# Patient Record
Sex: MALE | Race: BLACK OR AFRICAN AMERICAN | Employment: UNEMPLOYED | URBAN - METROPOLITAN AREA
[De-identification: names, ages, dates, MRNs, and addresses within clinical notes are randomized per-mention and may not be internally consistent; named-entity substitution may affect disease eponyms.]

---

## 2018-11-20 ENCOUNTER — HOSPITAL ENCOUNTER (EMERGENCY)
Age: 43
Discharge: HOME | End: 2018-11-20
Attending: EMERGENCY MEDICINE
Payer: MEDICAID

## 2018-11-20 ENCOUNTER — APPOINTMENT (OUTPATIENT)
Dept: CT IMAGING | Age: 43
End: 2018-11-20
Payer: MEDICAID

## 2018-11-20 VITALS
DIASTOLIC BLOOD PRESSURE: 81 MMHG | RESPIRATION RATE: 18 BRPM | SYSTOLIC BLOOD PRESSURE: 130 MMHG | BODY MASS INDEX: 33.97 KG/M2 | OXYGEN SATURATION: 99 % | HEART RATE: 70 BPM | WEIGHT: 236.77 LBS | TEMPERATURE: 97.4 F

## 2018-11-20 DIAGNOSIS — K76.9 LIVER LESION: ICD-10-CM

## 2018-11-20 DIAGNOSIS — S20.219A CONTUSION OF CHEST WALL, UNSPECIFIED LATERALITY, INITIAL ENCOUNTER: Primary | ICD-10-CM

## 2018-11-20 PROCEDURE — 93005 ELECTROCARDIOGRAM TRACING: CPT | Performed by: EMERGENCY MEDICINE

## 2018-11-20 PROCEDURE — 99284 EMERGENCY DEPT VISIT MOD MDM: CPT

## 2018-11-20 PROCEDURE — 6370000000 HC RX 637 (ALT 250 FOR IP): Performed by: EMERGENCY MEDICINE

## 2018-11-20 PROCEDURE — 71250 CT THORAX DX C-: CPT

## 2018-11-20 RX ORDER — OXYCODONE HYDROCHLORIDE AND ACETAMINOPHEN 5; 325 MG/1; MG/1
1 TABLET ORAL ONCE
Status: COMPLETED | OUTPATIENT
Start: 2018-11-20 | End: 2018-11-20

## 2018-11-20 RX ORDER — IBUPROFEN 400 MG/1
400 TABLET ORAL EVERY 6 HOURS PRN
Qty: 30 TABLET | Refills: 0 | Status: SHIPPED | OUTPATIENT
Start: 2018-11-20 | End: 2019-07-09

## 2018-11-20 RX ORDER — CYCLOBENZAPRINE HCL 10 MG
10 TABLET ORAL 3 TIMES DAILY PRN
Qty: 30 TABLET | Refills: 0 | Status: SHIPPED | OUTPATIENT
Start: 2018-11-20 | End: 2018-11-30

## 2018-11-20 RX ADMIN — LIDOCAINE HYDROCHLORIDE: 20 SOLUTION ORAL; TOPICAL at 02:36

## 2018-11-20 RX ADMIN — OXYCODONE AND ACETAMINOPHEN 1 TABLET: 5; 325 TABLET ORAL at 02:36

## 2018-11-20 ASSESSMENT — PAIN SCALES - GENERAL
PAINLEVEL_OUTOF10: 8
PAINLEVEL_OUTOF10: 9

## 2018-11-20 ASSESSMENT — PAIN DESCRIPTION - PAIN TYPE
TYPE: ACUTE PAIN

## 2018-11-20 ASSESSMENT — PAIN DESCRIPTION - LOCATION
LOCATION: CHEST

## 2018-11-20 ASSESSMENT — PAIN - FUNCTIONAL ASSESSMENT: PAIN_FUNCTIONAL_ASSESSMENT: 0-10

## 2018-11-20 NOTE — ED PROVIDER NOTES
UNIT/ML injection Inject 25 Units into the skin nightly. 7/22/13   NARESH Mcconnell - CNP   Blood Glucose Monitoring Suppl (FREESTYLE LITE) MARGARET 1 kit by Does not apply route 3 times daily. 6/6/13   Chao Flores MD   glucose blood VI test strips (FREESTYLE LITE) strip Patient to check blood sugar 4 times a day, he is treated with multiple daily injections of insulin  . 6/6/13   Chao Flores MD   FreeStyle Lancets MISC 100 each by Does not apply route 3 times daily. 6/6/13   Chao Flores MD   ibuprofen (ADVIL;MOTRIN) 600 MG tablet Take 1 tablet by mouth 3 times daily (with meals) for 7 days. 3/26/13 4/2/13  ANAT Seth       Allergies as of 11/20/2018 - Review Complete 11/20/2018   Allergen Reaction Noted    Dilaudid [hydromorphone] Itching 11/22/2013       Past Medical History:   Diagnosis Date    Diabetes mellitus ([facility] [address]     Hyperlipidemia     Hypertension     Neuropathy     Psychiatric problem     depression        Surgical History:   Past Surgical History:   Procedure Laterality Date    HIP SURGERY  01/2013    OTHER SURGICAL HISTORY Right 1/14/2013    excision of mass right hip        Family History:    Family History   Problem Relation Age of Onset    Diabetes Maternal Grandmother        Social History     Social History    Marital status: Legally      Spouse name: N/A    Number of children: N/A    Years of education: N/A     Occupational History    Not on file. Social History Main Topics    Smoking status: Former Smoker    Smokeless tobacco: Former User     Quit date: 1/8/2007    Alcohol use No      Comment: occasional    Drug use: Yes     Types: Marijuana      Comment: \"Smoke a little weed now and then for appetitie\".  Sexual activity: Not on file     Other Topics Concern    Not on file     Social History Narrative    No narrative on file       Nursing notes reviewed.     ED Triage Vitals [11/20/18 0103]   Enc Vitals Group      BP

## 2018-11-21 PROCEDURE — 93010 ELECTROCARDIOGRAM REPORT: CPT | Performed by: INTERNAL MEDICINE

## 2018-12-12 LAB
EKG ATRIAL RATE: 61 BPM
EKG DIAGNOSIS: NORMAL
EKG P AXIS: 71 DEGREES
EKG P-R INTERVAL: 166 MS
EKG Q-T INTERVAL: 416 MS
EKG QRS DURATION: 86 MS
EKG QTC CALCULATION (BAZETT): 418 MS
EKG R AXIS: 96 DEGREES
EKG T AXIS: -11 DEGREES
EKG VENTRICULAR RATE: 61 BPM

## 2019-07-09 ENCOUNTER — OFFICE VISIT (OUTPATIENT)
Dept: FAMILY MEDICINE CLINIC | Age: 44
End: 2019-07-09
Payer: MEDICAID

## 2019-07-09 VITALS
SYSTOLIC BLOOD PRESSURE: 138 MMHG | HEIGHT: 70 IN | OXYGEN SATURATION: 96 % | WEIGHT: 236.25 LBS | BODY MASS INDEX: 33.82 KG/M2 | DIASTOLIC BLOOD PRESSURE: 82 MMHG | HEART RATE: 72 BPM

## 2019-07-09 DIAGNOSIS — I10 ESSENTIAL HYPERTENSION: ICD-10-CM

## 2019-07-09 DIAGNOSIS — E11.40 TYPE 2 DIABETES MELLITUS WITH DIABETIC NEUROPATHY, WITH LONG-TERM CURRENT USE OF INSULIN (HCC): Primary | ICD-10-CM

## 2019-07-09 DIAGNOSIS — E78.5 HYPERLIPIDEMIA, UNSPECIFIED HYPERLIPIDEMIA TYPE: ICD-10-CM

## 2019-07-09 DIAGNOSIS — Z23 NEED FOR PNEUMOCOCCAL VACCINATION: ICD-10-CM

## 2019-07-09 DIAGNOSIS — Z79.4 TYPE 2 DIABETES MELLITUS WITH DIABETIC NEUROPATHY, WITH LONG-TERM CURRENT USE OF INSULIN (HCC): Primary | ICD-10-CM

## 2019-07-09 LAB
A/G RATIO: 1.5 (ref 1.1–2.2)
ALBUMIN SERPL-MCNC: 4.5 G/DL (ref 3.4–5)
ALP BLD-CCNC: 96 U/L (ref 40–129)
ALT SERPL-CCNC: 19 U/L (ref 10–40)
ANION GAP SERPL CALCULATED.3IONS-SCNC: 16 MMOL/L (ref 3–16)
AST SERPL-CCNC: 22 U/L (ref 15–37)
BILIRUB SERPL-MCNC: 0.8 MG/DL (ref 0–1)
BUN BLDV-MCNC: 9 MG/DL (ref 7–20)
CALCIUM SERPL-MCNC: 10 MG/DL (ref 8.3–10.6)
CHLORIDE BLD-SCNC: 103 MMOL/L (ref 99–110)
CHOLESTEROL, TOTAL: 178 MG/DL (ref 0–199)
CO2: 26 MMOL/L (ref 21–32)
CREAT SERPL-MCNC: 1 MG/DL (ref 0.9–1.3)
GFR AFRICAN AMERICAN: >60
GFR NON-AFRICAN AMERICAN: >60
GLOBULIN: 3 G/DL
GLUCOSE BLD-MCNC: 101 MG/DL (ref 70–99)
HBA1C MFR BLD: 6.6 %
HDLC SERPL-MCNC: 33 MG/DL (ref 40–60)
LDL CHOLESTEROL CALCULATED: 129 MG/DL
POTASSIUM SERPL-SCNC: 4.4 MMOL/L (ref 3.5–5.1)
SODIUM BLD-SCNC: 145 MMOL/L (ref 136–145)
TOTAL PROTEIN: 7.5 G/DL (ref 6.4–8.2)
TRIGL SERPL-MCNC: 82 MG/DL (ref 0–150)
VLDLC SERPL CALC-MCNC: 16 MG/DL

## 2019-07-09 PROCEDURE — 90471 IMMUNIZATION ADMIN: CPT | Performed by: INTERNAL MEDICINE

## 2019-07-09 PROCEDURE — 36415 COLL VENOUS BLD VENIPUNCTURE: CPT | Performed by: INTERNAL MEDICINE

## 2019-07-09 PROCEDURE — 3044F HG A1C LEVEL LT 7.0%: CPT | Performed by: INTERNAL MEDICINE

## 2019-07-09 PROCEDURE — 1036F TOBACCO NON-USER: CPT | Performed by: INTERNAL MEDICINE

## 2019-07-09 PROCEDURE — 99204 OFFICE O/P NEW MOD 45 MIN: CPT | Performed by: INTERNAL MEDICINE

## 2019-07-09 PROCEDURE — 90732 PPSV23 VACC 2 YRS+ SUBQ/IM: CPT | Performed by: INTERNAL MEDICINE

## 2019-07-09 PROCEDURE — 2022F DILAT RTA XM EVC RTNOPTHY: CPT | Performed by: INTERNAL MEDICINE

## 2019-07-09 PROCEDURE — 83036 HEMOGLOBIN GLYCOSYLATED A1C: CPT | Performed by: INTERNAL MEDICINE

## 2019-07-09 PROCEDURE — G8427 DOCREV CUR MEDS BY ELIG CLIN: HCPCS | Performed by: INTERNAL MEDICINE

## 2019-07-09 PROCEDURE — G8417 CALC BMI ABV UP PARAM F/U: HCPCS | Performed by: INTERNAL MEDICINE

## 2019-07-09 RX ORDER — OMEPRAZOLE 40 MG/1
CAPSULE, DELAYED RELEASE ORAL
COMMUNITY
Start: 2018-12-12 | End: 2019-07-09 | Stop reason: SDUPTHER

## 2019-07-09 RX ORDER — BACLOFEN 10 MG/1
10 TABLET ORAL 2 TIMES DAILY
Qty: 60 TABLET | Refills: 5 | Status: SHIPPED | OUTPATIENT
Start: 2019-07-09 | End: 2020-03-09

## 2019-07-09 RX ORDER — OMEPRAZOLE 40 MG/1
40 CAPSULE, DELAYED RELEASE ORAL DAILY
Qty: 30 CAPSULE | Refills: 5 | Status: SHIPPED | OUTPATIENT
Start: 2019-07-09 | End: 2020-03-09

## 2019-07-09 RX ORDER — ATORVASTATIN CALCIUM 20 MG/1
20 TABLET, FILM COATED ORAL DAILY
Qty: 30 TABLET | Refills: 5 | Status: SHIPPED | OUTPATIENT
Start: 2019-07-09 | End: 2019-07-11 | Stop reason: SDUPTHER

## 2019-07-09 RX ORDER — BLOOD-GLUCOSE METER
1 EACH MISCELLANEOUS DAILY
Qty: 1 KIT | Refills: 0 | Status: SHIPPED | OUTPATIENT
Start: 2019-07-09 | End: 2020-04-23 | Stop reason: SDUPTHER

## 2019-07-09 RX ORDER — GLUCOSAMINE HCL/CHONDROITIN SU 500-400 MG
CAPSULE ORAL
Refills: 2 | COMMUNITY
Start: 2019-05-14 | End: 2019-07-09 | Stop reason: SDUPTHER

## 2019-07-09 RX ORDER — BACLOFEN 10 MG/1
TABLET ORAL
COMMUNITY
Start: 2018-12-12 | End: 2019-07-09

## 2019-07-09 RX ORDER — INSULIN GLARGINE 100 [IU]/ML
INJECTION, SOLUTION SUBCUTANEOUS
Refills: 0 | COMMUNITY
Start: 2019-05-07 | End: 2019-07-09

## 2019-07-09 RX ORDER — LISINOPRIL 5 MG/1
5 TABLET ORAL DAILY
Qty: 30 TABLET | Refills: 5 | Status: SHIPPED | OUTPATIENT
Start: 2019-07-09 | End: 2020-02-03

## 2019-07-09 RX ORDER — LISINOPRIL 20 MG/1
TABLET ORAL
Refills: 2 | COMMUNITY
Start: 2019-06-14 | End: 2019-07-09

## 2019-07-09 RX ORDER — ATORVASTATIN CALCIUM 20 MG/1
TABLET, FILM COATED ORAL
Refills: 1 | COMMUNITY
Start: 2019-06-14 | End: 2019-07-09 | Stop reason: SDUPTHER

## 2019-07-09 RX ORDER — PEN NEEDLE, DIABETIC 32GX 5/32"
NEEDLE, DISPOSABLE MISCELLANEOUS
Refills: 3 | COMMUNITY
Start: 2019-05-14 | End: 2019-07-09

## 2019-07-09 RX ORDER — GLUCOSAMINE HCL/CHONDROITIN SU 500-400 MG
1 CAPSULE ORAL 4 TIMES DAILY
Qty: 120 EACH | Refills: 5 | Status: SHIPPED | OUTPATIENT
Start: 2019-07-09 | End: 2020-10-13 | Stop reason: SDUPTHER

## 2019-07-09 ASSESSMENT — PATIENT HEALTH QUESTIONNAIRE - PHQ9
SUM OF ALL RESPONSES TO PHQ QUESTIONS 1-9: 0
SUM OF ALL RESPONSES TO PHQ QUESTIONS 1-9: 0
1. LITTLE INTEREST OR PLEASURE IN DOING THINGS: 0
2. FEELING DOWN, DEPRESSED OR HOPELESS: 0
SUM OF ALL RESPONSES TO PHQ9 QUESTIONS 1 & 2: 0

## 2019-07-09 NOTE — PROGRESS NOTES
round, and reactive to light. Conjunctivae and EOM are normal.   Right Ear: External ear normal. TM normal  Left Ear: External ear normal. TM normal  Nose: Nose normal.   Mouth/Throat: Oropharynx is clear and moist.   Cardiovascular: Normal rate, regular rhythm and normal heart sounds. No murmur heard. Pulmonary/Chest: Effort normal. No respiratory distress. No wheezes, rhonchi, or crackles  Abdominal: Soft. Bowel sounds are normal. No distension. There is no tenderness. Musculoskeletal: Normal range of motion. No edema, tenderness or deformity. Lymphadenopathy: No cervical adenopathy. Neurological: alert. No cranial nerve deficit. Skin: Skin is warm and dry. Capillary refill takes less than 2 seconds. No rash noted. Psychiatric: Normal mood and affect. Assessment and Plan: Raissa Sanabria is a 44 y.o. male presenting today to establish care. Manolo Ashraf was seen today for established new doctor, diabetes, hypertension and hyperlipidemia. Diagnoses and all orders for this visit:    Type 2 diabetes mellitus with diabetic neuropathy, with long-term current use of insulin ([address]  Very good glycemic control. Continue present management. Work on lifestyle habits. He is reminded to have his eye exam.  Regular foot care. -     Comprehensive Metabolic Panel  -     Lipid Panel  -     POCT glycosylated hemoglobin (Hb A1C)    Essential hypertension  Blood pressure above goal off of therapy. He has difficulty with compliance on higher doses of the lisinopril. We will try him at a lower dose and have him take it daily  Hyperlipidemia, unspecified hyperlipidemia type  Continue statin     need for pneumococcal vaccination  Patient agreeable, administered today. Other orders  -     blood glucose test strips (ASCENSIA AUTODISC VI;ONE TOUCH ULTRA TEST VI) strip; 1 each by In Vitro route 4 times daily As needed. -     insulin glargine (BASAGLAR KWIKPEN) 100 UNIT/ML injection pen;  Inject 50 Units into the skin

## 2019-07-11 RX ORDER — ATORVASTATIN CALCIUM 40 MG/1
40 TABLET, FILM COATED ORAL DAILY
Qty: 30 TABLET | Refills: 5 | Status: SHIPPED | OUTPATIENT
Start: 2019-07-11 | End: 2020-03-09

## 2019-07-12 ENCOUNTER — TELEPHONE (OUTPATIENT)
Dept: ORTHOPEDIC SURGERY | Age: 44
End: 2019-07-12

## 2019-07-18 ENCOUNTER — TELEPHONE (OUTPATIENT)
Dept: FAMILY MEDICINE CLINIC | Age: 44
End: 2019-07-18

## 2019-10-14 ENCOUNTER — OFFICE VISIT (OUTPATIENT)
Dept: FAMILY MEDICINE CLINIC | Age: 44
End: 2019-10-14
Payer: MEDICAID

## 2019-10-14 VITALS
OXYGEN SATURATION: 97 % | HEART RATE: 71 BPM | SYSTOLIC BLOOD PRESSURE: 122 MMHG | WEIGHT: 237.4 LBS | RESPIRATION RATE: 17 BRPM | BODY MASS INDEX: 34.06 KG/M2 | DIASTOLIC BLOOD PRESSURE: 72 MMHG

## 2019-10-14 DIAGNOSIS — E11.40 TYPE 2 DIABETES MELLITUS WITH DIABETIC NEUROPATHY, WITH LONG-TERM CURRENT USE OF INSULIN (HCC): Primary | ICD-10-CM

## 2019-10-14 DIAGNOSIS — Z79.4 TYPE 2 DIABETES MELLITUS WITH DIABETIC NEUROPATHY, WITH LONG-TERM CURRENT USE OF INSULIN (HCC): Primary | ICD-10-CM

## 2019-10-14 DIAGNOSIS — E78.5 HYPERLIPIDEMIA, UNSPECIFIED HYPERLIPIDEMIA TYPE: ICD-10-CM

## 2019-10-14 DIAGNOSIS — I10 ESSENTIAL HYPERTENSION: ICD-10-CM

## 2019-10-14 LAB — HBA1C MFR BLD: 6.3 %

## 2019-10-14 PROCEDURE — 3044F HG A1C LEVEL LT 7.0%: CPT | Performed by: INTERNAL MEDICINE

## 2019-10-14 PROCEDURE — 83036 HEMOGLOBIN GLYCOSYLATED A1C: CPT | Performed by: INTERNAL MEDICINE

## 2019-10-14 PROCEDURE — 99214 OFFICE O/P EST MOD 30 MIN: CPT | Performed by: INTERNAL MEDICINE

## 2019-10-14 PROCEDURE — 2022F DILAT RTA XM EVC RTNOPTHY: CPT | Performed by: INTERNAL MEDICINE

## 2019-10-14 PROCEDURE — G8417 CALC BMI ABV UP PARAM F/U: HCPCS | Performed by: INTERNAL MEDICINE

## 2019-10-14 PROCEDURE — G8484 FLU IMMUNIZE NO ADMIN: HCPCS | Performed by: INTERNAL MEDICINE

## 2019-10-14 PROCEDURE — G8427 DOCREV CUR MEDS BY ELIG CLIN: HCPCS | Performed by: INTERNAL MEDICINE

## 2019-10-14 PROCEDURE — 1036F TOBACCO NON-USER: CPT | Performed by: INTERNAL MEDICINE

## 2019-10-14 ASSESSMENT — ENCOUNTER SYMPTOMS: SHORTNESS OF BREATH: 0

## 2020-01-07 ENCOUNTER — TELEPHONE (OUTPATIENT)
Dept: INTERNAL MEDICINE CLINIC | Age: 45
End: 2020-01-07

## 2020-01-07 NOTE — TELEPHONE ENCOUNTER
Submitted PA for HumaLOG KwikPen 100UNIT/ML pen-injectors    Via CMM Key: [id number]    STATUS: PENDING

## 2020-01-09 ENCOUNTER — TELEPHONE (OUTPATIENT)
Dept: FAMILY MEDICINE CLINIC | Age: 45
End: 2020-01-09

## 2020-01-09 RX ORDER — INDOMETHACIN 50 MG/1
50 CAPSULE ORAL 3 TIMES DAILY
COMMUNITY
Start: 2018-12-12 | End: 2021-04-06

## 2020-01-09 NOTE — TELEPHONE ENCOUNTER
Spoke to pt, he said he would like a referral for a hand specialist.  The pt stated when he was at [facility] they did recommend a hand specialist, but they apparently didn't write a referral.

## 2020-01-09 NOTE — TELEPHONE ENCOUNTER
PT says that about two weeks ago he went to the ER because his ring finger on his right hand was swelled up. PT says he had an MRI and Xray done on that hand neither showed anything wrong. PT says that the swelling has made his whole hand hurt and he's unable to make a fist with that hand due to the swelling and pain. PT would like to know what to do.     Please call PT back: [phone]

## 2020-01-22 NOTE — TELEPHONE ENCOUNTER
Pt states since he started working, he tests his BS 5-6 times a day .  He is due for his strips 1-23, the pharmacy will not refill til then , he has been out 3 days, Albert([phone]) states we have to start the PA or over ride to get this filled today

## 2020-01-22 NOTE — TELEPHONE ENCOUNTER
Called Pharmacy-they stated nothing we can do as far as PA-refill too soon  Pt will have to wait til 1-23 to have filled  Pt l/m detailed

## 2020-01-24 RX ORDER — INSULIN GLARGINE 100 [IU]/ML
INJECTION, SOLUTION SUBCUTANEOUS
Qty: 15 ML | Refills: 5 | Status: SHIPPED | OUTPATIENT
Start: 2020-01-24 | End: 2020-02-06

## 2020-01-24 RX ORDER — INSULIN LISPRO 100 [IU]/ML
INJECTION, SOLUTION INTRAVENOUS; SUBCUTANEOUS
Qty: 15 ML | Refills: 5 | Status: SHIPPED | OUTPATIENT
Start: 2020-01-24 | End: 2020-03-03

## 2020-02-03 RX ORDER — LISINOPRIL 5 MG/1
5 TABLET ORAL DAILY
Qty: 30 TABLET | Refills: 5 | Status: SHIPPED | OUTPATIENT
Start: 2020-02-03 | End: 2020-06-09

## 2020-02-06 RX ORDER — INSULIN GLARGINE 100 [IU]/ML
INJECTION, SOLUTION SUBCUTANEOUS
Qty: 15 ML | Refills: 5 | Status: SHIPPED | OUTPATIENT
Start: 2020-02-06 | End: 2020-03-03

## 2020-03-03 RX ORDER — INSULIN LISPRO 100 [IU]/ML
INJECTION, SOLUTION INTRAVENOUS; SUBCUTANEOUS
Qty: 15 ML | Refills: 5 | Status: SHIPPED | OUTPATIENT
Start: 2020-03-03 | End: 2020-04-15

## 2020-03-03 RX ORDER — INSULIN GLARGINE 100 [IU]/ML
INJECTION, SOLUTION SUBCUTANEOUS
Qty: 15 ML | Refills: 5 | Status: SHIPPED | OUTPATIENT
Start: 2020-03-03 | End: 2020-08-31

## 2020-03-09 RX ORDER — ATORVASTATIN CALCIUM 40 MG/1
40 TABLET, FILM COATED ORAL DAILY
Qty: 30 TABLET | Refills: 5 | Status: SHIPPED | OUTPATIENT
Start: 2020-03-09 | End: 2020-09-10

## 2020-03-09 RX ORDER — PEN NEEDLE, DIABETIC 32GX 5/32"
NEEDLE, DISPOSABLE MISCELLANEOUS
Qty: 100 EACH | Refills: 5 | Status: SHIPPED | OUTPATIENT
Start: 2020-03-09 | End: 2020-11-12 | Stop reason: SDUPTHER

## 2020-03-09 RX ORDER — BACLOFEN 10 MG/1
TABLET ORAL
Qty: 60 TABLET | Refills: 5 | Status: SHIPPED | OUTPATIENT
Start: 2020-03-09 | End: 2022-03-17 | Stop reason: SDUPTHER

## 2020-03-09 RX ORDER — OMEPRAZOLE 40 MG/1
40 CAPSULE, DELAYED RELEASE ORAL DAILY
Qty: 30 CAPSULE | Refills: 5 | Status: SHIPPED | OUTPATIENT
Start: 2020-03-09 | End: 2020-10-13

## 2020-04-10 ENCOUNTER — TELEPHONE (OUTPATIENT)
Dept: ADMINISTRATIVE | Age: 45
End: 2020-04-10

## 2020-04-10 NOTE — TELEPHONE ENCOUNTER
Submitted PA for HumaLOG KwikPen 100UNIT/ML pen-injectors    Via CMM  (Key: [id number]   STATUS: PENDING

## 2020-04-15 RX ORDER — INSULIN LISPRO 100 [IU]/ML
30 INJECTION, SOLUTION INTRAVENOUS; SUBCUTANEOUS
Qty: 15 ML | Refills: 5 | Status: SHIPPED | OUTPATIENT
Start: 2020-04-15 | End: 2020-10-13

## 2020-04-20 NOTE — TELEPHONE ENCOUNTER
pls let him know that a PA was done for  humalog and denied - lispro is the same medication essentially.  Monitor blood sugar and if noticing an increase, we will need to increase his dose

## 2020-04-23 RX ORDER — BLOOD-GLUCOSE METER
1 EACH MISCELLANEOUS DAILY
Qty: 1 KIT | Refills: 0 | Status: SHIPPED | OUTPATIENT
Start: 2020-04-23 | End: 2021-04-08 | Stop reason: SDUPTHER

## 2020-06-09 RX ORDER — LISINOPRIL 5 MG/1
5 TABLET ORAL DAILY
Qty: 30 TABLET | Refills: 5 | Status: SHIPPED | OUTPATIENT
Start: 2020-06-09 | End: 2021-01-20

## 2020-07-08 ENCOUNTER — HOSPITAL ENCOUNTER (EMERGENCY)
Age: 45
Discharge: HOME | End: 2020-07-08
Payer: MEDICAID

## 2020-07-08 ENCOUNTER — APPOINTMENT (OUTPATIENT)
Dept: GENERAL RADIOLOGY | Age: 45
End: 2020-07-08
Payer: MEDICAID

## 2020-07-08 VITALS
WEIGHT: 227.31 LBS | DIASTOLIC BLOOD PRESSURE: 99 MMHG | HEART RATE: 64 BPM | BODY MASS INDEX: 31.82 KG/M2 | HEIGHT: 71 IN | SYSTOLIC BLOOD PRESSURE: 176 MMHG | OXYGEN SATURATION: 100 % | TEMPERATURE: 98.3 F | RESPIRATION RATE: 16 BRPM

## 2020-07-08 PROCEDURE — 99283 EMERGENCY DEPT VISIT LOW MDM: CPT

## 2020-07-08 PROCEDURE — 72110 X-RAY EXAM L-2 SPINE 4/>VWS: CPT

## 2020-07-08 PROCEDURE — 6370000000 HC RX 637 (ALT 250 FOR IP): Performed by: PHYSICIAN ASSISTANT

## 2020-07-08 PROCEDURE — 72100 X-RAY EXAM L-S SPINE 2/3 VWS: CPT

## 2020-07-08 RX ORDER — METHOCARBAMOL 500 MG/1
1000 TABLET, FILM COATED ORAL ONCE
Status: COMPLETED | OUTPATIENT
Start: 2020-07-08 | End: 2020-07-08

## 2020-07-08 RX ORDER — METHOCARBAMOL 750 MG/1
750 TABLET, FILM COATED ORAL 2 TIMES DAILY
Qty: 20 TABLET | Refills: 0 | Status: SHIPPED | OUTPATIENT
Start: 2020-07-08 | End: 2021-04-06

## 2020-07-08 RX ORDER — IBUPROFEN 800 MG/1
800 TABLET ORAL EVERY 8 HOURS PRN
Qty: 30 TABLET | Refills: 0 | Status: SHIPPED | OUTPATIENT
Start: 2020-07-08 | End: 2020-10-09

## 2020-07-08 RX ORDER — OXYCODONE HYDROCHLORIDE AND ACETAMINOPHEN 5; 325 MG/1; MG/1
1 TABLET ORAL ONCE
Status: COMPLETED | OUTPATIENT
Start: 2020-07-08 | End: 2020-07-08

## 2020-07-08 RX ORDER — HYDROCODONE BITARTRATE AND ACETAMINOPHEN 5; 325 MG/1; MG/1
1 TABLET ORAL EVERY 6 HOURS PRN
Qty: 12 TABLET | Refills: 0 | Status: SHIPPED | OUTPATIENT
Start: 2020-07-08 | End: 2020-07-11

## 2020-07-08 RX ORDER — IBUPROFEN 400 MG/1
800 TABLET ORAL ONCE
Status: COMPLETED | OUTPATIENT
Start: 2020-07-08 | End: 2020-07-08

## 2020-07-08 RX ADMIN — METHOCARBAMOL TABLETS 1000 MG: 500 TABLET, COATED ORAL at 13:39

## 2020-07-08 RX ADMIN — IBUPROFEN 800 MG: 400 TABLET, FILM COATED ORAL at 13:39

## 2020-07-08 RX ADMIN — OXYCODONE HYDROCHLORIDE AND ACETAMINOPHEN 1 TABLET: 5; 325 TABLET ORAL at 13:39

## 2020-07-08 ASSESSMENT — ENCOUNTER SYMPTOMS
CHOKING: 0
APNEA: 0
VOMITING: 0
ABDOMINAL PAIN: 0
BACK PAIN: 1
EYE REDNESS: 0
SHORTNESS OF BREATH: 0
EYE DISCHARGE: 0
NAUSEA: 0
FACIAL SWELLING: 0

## 2020-07-08 ASSESSMENT — PAIN DESCRIPTION - PAIN TYPE: TYPE: ACUTE PAIN

## 2020-07-08 ASSESSMENT — PAIN SCALES - GENERAL
PAINLEVEL_OUTOF10: 10
PAINLEVEL_OUTOF10: 10

## 2020-07-08 ASSESSMENT — PAIN DESCRIPTION - LOCATION: LOCATION: BACK

## 2020-07-08 ASSESSMENT — PAIN DESCRIPTION - ORIENTATION: ORIENTATION: LOWER;MID

## 2020-07-08 NOTE — ED PROVIDER NOTES
**EVALUATED BY ADVANCED PRACTICE PROVIDER**        [address] [facility]      Pt Name: Fatou Hall  ZORAIDA:[id number]  [place] 1975  Date of evaluation: 7/8/2020  Provider: Josse Koch PA-C      Chief Complaint:    Chief Complaint   Patient presents with    Back Pain     lower back pain started yesterday around 2030 after coming home from work        Nursing Notes, Past Medical Hx, Past Surgical Hx, Social Hx, Allergies, and Family Hx were all reviewed and agreed with or any disagreements were addressed in the HPI.    HPI:  (Location, Duration, Timing, Severity, Quality, Assoc Sx, Context, Modifying factors)  This is a  38 y.o. male complain of low back pain. 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 He gets some pain every day. But yesterday started having severe pain when he got home. Said he woke up this morning could barely stand up. He decided to lay down and when he try to get up again it was so severe that he could barely move. He came in by squad. Denies abdominal pain, no loss of bowel or urinary control. No numbness or tingling in his feet or finger. No upper back or neck pain. There is no direct injury. No other complaints.         PastMedical/Surgical History:      Diagnosis Date    Diabetes mellitus ([address]     Hyperlipidemia     Hypertension     Neuropathy     Psychiatric problem     depression         Procedure Laterality Date    HIP SURGERY  01/2013    OTHER SURGICAL HISTORY Right 1/14/2013    excision of mass right hip       Medications:  Previous Medications    ALCOHOL SWABS 70 % PADS    Apply 1 Units topically 4 times daily    ATORVASTATIN (LIPITOR) 40 MG TABLET    TAKE 1 TABLET BY MOUTH DAILY    BACLOFEN (LIORESAL) 10 MG TABLET    TAKE 1 TABLET BY MOUTH TWICE DAILY    BASAGLAR KWIKPEN 100 UNIT/ML INJECTION PEN    INJECT 50 UNITS INTO THE SKIN DAILY    BD PEN NEEDLE VIBHA U/F 32G X 4 MM MISC    USE AS DIRECTED FOUR TIMES DAILY    BLOOD GLUCOSE MONITORING SUPPL (ONE TOUCH ULTRA 2) W/DEVICE KIT    1 kit by Does not apply route daily    DICLOFENAC SODIUM 1 % GEL    Apply  topically 4 times daily as needed for Pain. INDOMETHACIN (INDOCIN) 50 MG CAPSULE    Take 50 mg by mouth 3 times daily    INSULIN LISPRO, 1 UNIT DIAL, (HUMALOG KWIKPEN) 100 UNIT/ML SOPN    Inject 30 Units into the skin 3 times daily (before meals) Generic lispro    LISINOPRIL (PRINIVIL;ZESTRIL) 5 MG TABLET    TAKE 1 TABLET BY MOUTH DAILY    OMEPRAZOLE (PRILOSEC) 40 MG DELAYED RELEASE CAPSULE    TAKE 1 CAPSULE BY MOUTH DAILY    ONE TOUCH ULTRA TEST STRIP    TEST FOUR TIMES DAILY AS NEEDED         Review of Systems:  Review of Systems   Constitutional: Negative for chills and fever. HENT: Negative for congestion, facial swelling and sneezing. Eyes: Negative for discharge and redness. Respiratory: Negative for apnea, choking and shortness of breath. Cardiovascular: Negative for chest pain. Gastrointestinal: Negative for abdominal pain, nausea and vomiting. Genitourinary: Negative for dysuria. Musculoskeletal: Positive for back pain. Negative for neck pain and neck stiffness. Neurological: Negative for dizziness, tremors, seizures and headaches. All other systems reviewed and are negative. Positives and Pertinent negatives as per HPI. Except as noted above in the ROS, problem specific ROS was completed and is negative. Physical Exam:  Physical Exam  Vitals signs and nursing note reviewed. Constitutional:       Appearance: He is well-developed. He is not diaphoretic. HENT:      Head: Normocephalic and atraumatic. Nose: Nose normal.      Mouth/Throat:      Mouth: Mucous membranes are moist.      Pharynx: Oropharynx is clear. Eyes:      General:         Right eye: No discharge. Left eye: No discharge. Extraocular Movements: Extraocular movements intact.       Conjunctiva/sclera: Conjunctivae normal. Pupils: Pupils are equal, round, and reactive to light. Neck:      Musculoskeletal: Normal range of motion and neck supple. Cardiovascular:      Rate and Rhythm: Normal rate and regular rhythm. Heart sounds: Normal heart sounds. No murmur. No friction rub. No gallop. Pulmonary:      Effort: Pulmonary effort is normal. No respiratory distress. Breath sounds: Normal breath sounds. No wheezing or rales. Chest:      Chest wall: No tenderness. Abdominal:      General: Abdomen is flat. Bowel sounds are normal.      Palpations: Abdomen is soft. Tenderness: There is no abdominal tenderness. Musculoskeletal: Normal range of motion. Skin:     General: Skin is warm and dry. Neurological:      Mental Status: He is alert and oriented to person, place, and time. Psychiatric:         Behavior: Behavior normal.         MEDICAL DECISION MAKING    Vitals:    Vitals:    07/08/20 1257   BP: (!) 176/99   Pulse: 64   Resp: 16   Temp: 98.3 °F (36.8 °C)   TempSrc: Oral   SpO2: 100%   Weight: 227 lb 5 oz (103.1 kg)   Height: 5' 10.5\" (1.791 m)       LABS:Labs Reviewed - No data to display     Remainder of labs reviewed and werenegative at this time or not returned at the time of this note. RADIOLOGY:   Non-plain film images such as CT, Ultrasound and MRI are read by the radiologist. Millie Craig PA-C have directly visualized the radiologic plain film image(s) with the below findings:        Interpretation per the Radiologist below, if available at the time of this note:    XR LUMBAR SPINE (MIN 4 VIEWS)   Final Result   No acute abnormality. No fracture or malalignment. Mild degenerative   changes of the facet joints. No results found.        MEDICAL DECISION MAKING / ED COURSE:      PROCEDURES:   Procedures    None    Patient was given:  Medications   ibuprofen (ADVIL;MOTRIN) tablet 800 mg (800 mg Oral Given 7/8/20 1977)   oxyCODONE-acetaminophen (PERCOCET) 5-325 MG per tablet 1 tablet (1 tablet Oral Given 7/8/20 9219)   methocarbamol (ROBAXIN) tablet 1,000 mg (1,000 mg Oral Given 7/8/20 1339)     Emergency room course: Patient on exam cardiovascular regular rate and rhythm. .  Lungs are clear. Abdomen soft nontender. Normal bowel sounds all 4 quadrant. No CVA or flank tenderness. Bilateral lower extremities show no swelling. He has a straight leg raise positive on both sides when he gets more than 30 degrees he started having pain in his back bilaterally. Good strength against resisted plantar and dorsiflexion. Patient has no midline tender cervical thoracic spine lumbar spine does shows some mild lower lumbar tenderness midline in both right and left side all the way across. Does not extend into his buttocks. No saddle anesthesia. Neurologically patient has no other motor or sensory deficit noted.  strength 5+ equal bilaterally. He is ambulatory with no difficulty. X-ray shows no acute abnormality. No fractures or malalignment. Mild degenerative disease of the facets joint. Discussed patient x-ray results with him. He still looks and feels very uncomfortable. I discussed with him that I will put him on same medication I gave him here. He is apply heat to his back. I will give him orthopedic referral.  Give it a week see how his feel. If he feels worse or no improvement follow-up orthopedics otherwise follow-up primary care physician. He was okay with this plan. He will be discharged stable condition. The patient tolerated their visit well. I evaluated the patient. The physician was available for consultation as needed. The patient and / or the family were informed of the results of any tests, a time was given to answer questions, a plan was proposed and they agreed with plan. CLINICAL IMPRESSION:  1.  Strain of lumbar region, initial encounter        DISPOSITION  DISPOSITION Decision To Discharge 07/08/2020 04:38:20 PM        PATIENT REFERRED TO:  Trena Butts, [address]  [address] [address]  [phone]    Call   As needed    Suraj Gimenez MD  [address] [place] [address]. [address] [place] [address]  [phone]    Call in 1 week  As needed, If symptoms worsen      DISCHARGE MEDICATIONS:  New Prescriptions    HYDROCODONE-ACETAMINOPHEN (NORCO) 5-325 MG PER TABLET    Take 1 tablet by mouth every 6 hours as needed for Pain for up to 3 days.     IBUPROFEN (ADVIL;MOTRIN) 800 MG TABLET    Take 1 tablet by mouth every 8 hours as needed for Pain    METHOCARBAMOL (ROBAXIN-750) 750 MG TABLET    Take 1 tablet by mouth 2 times daily       DISCONTINUED MEDICATIONS:  Discontinued Medications    No medications on file              (Please note the MDM and HPI sections of this note were completed with a voice recognition program.  Efforts were made to edit the dictations but occasionally words are mis-transcribed.)    Electronically signed, Abdiaziz Avila PA-C,          Abdiaziz Avila PA-C  07/08/20 1790

## 2020-09-08 ENCOUNTER — APPOINTMENT (OUTPATIENT)
Dept: GENERAL RADIOLOGY | Age: 45
End: 2020-09-08
Payer: MEDICAID

## 2020-09-08 ENCOUNTER — HOSPITAL ENCOUNTER (EMERGENCY)
Age: 45
Discharge: HOME | End: 2020-09-08
Attending: EMERGENCY MEDICINE
Payer: MEDICAID

## 2020-09-08 VITALS
WEIGHT: 223.33 LBS | HEART RATE: 67 BPM | OXYGEN SATURATION: 99 % | DIASTOLIC BLOOD PRESSURE: 93 MMHG | SYSTOLIC BLOOD PRESSURE: 167 MMHG | RESPIRATION RATE: 14 BRPM | BODY MASS INDEX: 31.27 KG/M2 | HEIGHT: 71 IN | TEMPERATURE: 98.5 F

## 2020-09-08 PROCEDURE — 99282 EMERGENCY DEPT VISIT SF MDM: CPT

## 2020-09-08 PROCEDURE — 6370000000 HC RX 637 (ALT 250 FOR IP): Performed by: EMERGENCY MEDICINE

## 2020-09-08 RX ORDER — OXYCODONE HYDROCHLORIDE AND ACETAMINOPHEN 5; 325 MG/1; MG/1
1 TABLET ORAL ONCE
Status: COMPLETED | OUTPATIENT
Start: 2020-09-08 | End: 2020-09-08

## 2020-09-08 RX ADMIN — OXYCODONE HYDROCHLORIDE AND ACETAMINOPHEN 1 TABLET: 5; 325 TABLET ORAL at 02:02

## 2020-09-08 ASSESSMENT — PAIN DESCRIPTION - ONSET
ONSET: ON-GOING

## 2020-09-08 ASSESSMENT — ENCOUNTER SYMPTOMS
SHORTNESS OF BREATH: 0
COLOR CHANGE: 0
APNEA: 0
ABDOMINAL DISTENTION: 0
EYE REDNESS: 0
EYE DISCHARGE: 0
COUGH: 0
NAUSEA: 0
BACK PAIN: 0
WHEEZING: 0
CHEST TIGHTNESS: 0
DIARRHEA: 0
VOMITING: 0
STRIDOR: 0
EYE PAIN: 0
EYE ITCHING: 0
CHOKING: 0
BLOOD IN STOOL: 0
ABDOMINAL PAIN: 0
RECTAL PAIN: 0
ANAL BLEEDING: 0
CONSTIPATION: 0
PHOTOPHOBIA: 0

## 2020-09-08 ASSESSMENT — PAIN SCALES - GENERAL
PAINLEVEL_OUTOF10: 8

## 2020-09-08 ASSESSMENT — PAIN - FUNCTIONAL ASSESSMENT
PAIN_FUNCTIONAL_ASSESSMENT: ACTIVITIES ARE NOT PREVENTED

## 2020-09-08 ASSESSMENT — PAIN DESCRIPTION - FREQUENCY
FREQUENCY: CONTINUOUS

## 2020-09-08 ASSESSMENT — PAIN DESCRIPTION - ORIENTATION
ORIENTATION: POSTERIOR

## 2020-09-08 ASSESSMENT — PAIN DESCRIPTION - PAIN TYPE
TYPE: ACUTE PAIN

## 2020-09-08 ASSESSMENT — PAIN DESCRIPTION - LOCATION
LOCATION: FOOT

## 2020-09-08 ASSESSMENT — PAIN DESCRIPTION - PROGRESSION
CLINICAL_PROGRESSION: NOT CHANGED

## 2020-09-08 ASSESSMENT — PAIN DESCRIPTION - DESCRIPTORS
DESCRIPTORS: POUNDING
DESCRIPTORS: POUNDING

## 2020-09-08 NOTE — ED PROVIDER NOTES
[address] [facility]      Pt Name: Marichuy Lee  MRN: [id number]  [place] 1975  Date of evaluation: 9/8/2020  Provider: Isabelle Hopkins MD    [address] [place] [address]       Chief Complaint   Patient presents with    Foot Pain     left foot pain, heel of foot has been hurting without injury for two weeks. Worse on initial standing, but improves when he walks on it. Overall appearance looks well, no swelling noted. Patient is diabetic with well appearing feet bilaterally. HISTORY OF PRESENT ILLNESS    Marichuy Lee is a 41 y.o. male who presents to the emergency department with left foot pain. Endorses pain to the heel of his left foot. No trauma. 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 Pain is 3/10 sharp and constant in nature. Has been taking OTC medications with minimal relief. Nothing makes symptoms better but movement makes symptoms worse. This has never happened before. No other associated symptoms other than previously mentioned. Nursing Notes were reviewed. Including nursing noted for FM, Surgical History, Past Medical History, Social History, vitals, and allergies; agree with all. REVIEW OF SYSTEMS       Review of Systems   Constitutional: Negative for activity change, appetite change, chills, diaphoresis, fatigue, fever and unexpected weight change. HENT: Negative for congestion, dental problem, drooling, ear discharge and ear pain. Eyes: Negative for photophobia, pain, discharge, redness, itching and visual disturbance. Respiratory: Negative for apnea, cough, choking, chest tightness, shortness of breath, wheezing and stridor. Cardiovascular: Negative for chest pain, palpitations and leg swelling. Gastrointestinal: Negative for abdominal distention, abdominal pain, anal bleeding, blood in stool, constipation, diarrhea, nausea, rectal pain and vomiting.    Endocrine: Negative for cold intolerance and heat intolerance. Genitourinary: Negative for decreased urine volume and urgency. Musculoskeletal: Positive for arthralgias. Negative for back pain. Skin: Negative for color change and pallor. Neurological: Negative for dizziness and facial asymmetry. Hematological: Negative for adenopathy. Does not bruise/bleed easily. Psychiatric/Behavioral: Negative for agitation, behavioral problems, confusion and decreased concentration. Except as noted above the remainder of the review of systems was reviewed and negative.      PAST MEDICAL HISTORY     Past Medical History:   Diagnosis Date    Diabetes mellitus ([facility] [address]     Hyperlipidemia     Hypertension     Neuropathy     Psychiatric problem     depression       SURGICAL HISTORY       Past Surgical History:   Procedure Laterality Date    HIP SURGERY  01/2013    OTHER SURGICAL HISTORY Right 1/14/2013    excision of mass right hip       CURRENT MEDICATIONS       Discharge Medication List as of 9/8/2020  2:05 AM      CONTINUE these medications which have NOT CHANGED    Details   BASAGLAR KWIKPEN 100 UNIT/ML injection pen INJECT 50 UNITS INTO THE SKIN DAILY, Disp-15 mL,R-0Normal      methocarbamol (ROBAXIN-750) 750 MG tablet Take 1 tablet by mouth 2 times daily, Disp-20 tablet, R-0Print      ibuprofen (ADVIL;MOTRIN) 800 MG tablet Take 1 tablet by mouth every 8 hours as needed for Pain, Disp-30 tablet, R-0Print      lisinopril (PRINIVIL;ZESTRIL) 5 MG tablet TAKE 1 TABLET BY MOUTH DAILY, Disp-30 tablet, R-5Normal      Blood Glucose Monitoring Suppl (ONE TOUCH ULTRA 2) w/Device KIT DAILY Starting Thu 4/23/2020, Disp-1 kit, R-0, Normal      insulin lispro, 1 Unit Dial, (HUMALOG KWIKPEN) 100 UNIT/ML SOPN Inject 30 Units into the skin 3 times daily (before meals) Generic lispro, Disp-15 mL, R-5Normal      ONE TOUCH ULTRA TEST strip TEST FOUR TIMES DAILY AS NEEDED, Disp-200 strip, R-5Normal      BD PEN NEEDLE VIBHA U/F 32G X 4 MM MISC Disp-100 each, R-5, Normal omeprazole (PRILOSEC) 40 MG delayed release capsule TAKE 1 CAPSULE BY MOUTH DAILY, Disp-30 capsule, R-5Normal      baclofen (LIORESAL) 10 MG tablet TAKE 1 TABLET BY MOUTH TWICE DAILY, Disp-60 tablet, R-5Normal      atorvastatin (LIPITOR) 40 MG tablet TAKE 1 TABLET BY MOUTH DAILY, Disp-30 tablet, R-5Normal      indomethacin (INDOCIN) 50 MG capsule Take 50 mg by mouth 3 times dailyHistorical Med      Alcohol Swabs 70 % PADS 4 TIMES DAILY Starting Tue 7/9/2019, Disp-120 each, R-5, Normal      diclofenac sodium 1 % GEL Apply  topically 4 times daily as needed for Pain., Topical, 4 TIMES DAILY PRN Starting 11/25/2013, Until Discontinued, Disp-1 Tube, R-0, Print             ALLERGIES     Dilaudid [hydromorphone]    FAMILY HISTORY        Family History   Problem Relation Age of Onset    Diabetes Maternal Grandmother        SOCIAL HISTORY       Social History     Socioeconomic History    Marital status: Single     Spouse name: Not on file    Number of children: Not on file    Years of education: Not on file    Highest education level: Not on file   Occupational History    Not on file   Social Needs    Financial resource strain: Not on file    Food insecurity     Worry: Not on file     Inability: Not on file    Transportation needs     Medical: Not on file     Non-medical: Not on file   Tobacco Use    Smoking status: Former Smoker    Smokeless tobacco: Former User     Quit date: 1/8/2007   Substance and Sexual Activity    Alcohol use: No     Comment: occasional    Drug use: Not Currently     Types: Marijuana     Comment: \"Smoke a little weed now and then for appetitie\".     Sexual activity: Yes     Partners: Female   Lifestyle    Physical activity     Days per week: Not on file     Minutes per session: Not on file    Stress: Not on file   Relationships    Social connections     Talks on phone: Not on file     Gets together: Not on file     Attends [employer] service: Not on file     Active member of club Motor: No abnormal muscle tone. Coordination: Coordination normal.         DIAGNOSTIC RESULTS     EKG: All EKG's are interpreted by the Emergency Department Physician who either signs or Co-signs this chart in the absence of acardiologist.    None    RADIOLOGY:   Non-plain film images such as CT, Ultrasoundand MRI are read by the radiologist. Plain radiographic images are visualized and preliminarily interpreted by the emergency physician with the below findings:    None    ED BEDSIDE ULTRASOUND:   Performed by ED Physician - none    LABS:  Labs Reviewed - No data to display    All other labs were withinnormal range or not returned as of this dictation. EMERGENCY DEPARTMENT COURSE and DIFFERENTIAL DIAGNOSIS/MDM:     PMH, Surgical Hx, FH, Social Hx reviewed by myself (ETOH usage, Tobacco usage, Drug usage reviewed by myself, no pertinent Hx)- No Pertinent Hx     Old records were reviewed by me    Ace wrap    RICE    Close PCP follow up for re-evaluation    Limited mobility for 1 week    I estimate there is LOW risk for Sepsis, MI, Stroke, Tamponade, PTX, Toxicity or other life threatening etiology thus I consider the discharge disposition reasonable. The patient is at low risk for mortality based on demographic, history and clinical factors. Given the best available information and clinical assessment, I estimate the risk of hospitalization to be greater than risk of treatment at home. I have explained to the patient that the risk could rapidly change, given precautions for return and instructions. Explained to patient that the risk for mortality is low based on demographic, history and clinical factors. I discussed with patient the results of evaluation in the ED, diagnosis, care, and prognosis. The plan is to discharge to home. Patient is in agreement with plan and questions have been answered.       I also discussed with patient the reasons which may require a return visit and the importance of

## 2020-09-09 RX ORDER — ATORVASTATIN CALCIUM 40 MG/1
40 TABLET, FILM COATED ORAL DAILY
Qty: 30 TABLET | Refills: 5 | Status: CANCELLED | OUTPATIENT
Start: 2020-09-09

## 2020-09-10 RX ORDER — ATORVASTATIN CALCIUM 40 MG/1
40 TABLET, FILM COATED ORAL DAILY
Qty: 30 TABLET | Refills: 5 | Status: SHIPPED | OUTPATIENT
Start: 2020-09-10 | End: 2021-04-08 | Stop reason: SDUPTHER

## 2020-09-14 ENCOUNTER — TELEPHONE (OUTPATIENT)
Dept: FAMILY MEDICINE CLINIC | Age: 45
End: 2020-09-14

## 2020-09-17 ENCOUNTER — TELEPHONE (OUTPATIENT)
Dept: ADMINISTRATIVE | Age: 45
End: 2020-09-17

## 2020-09-17 NOTE — TELEPHONE ENCOUNTER
Submitted PA for [employer] 100UNIT/ML pen-injectors    Via CMM Key: [id number]    STATUS: approved  Through 9/17/2021    . Please notify patient, thank you.

## 2020-09-29 ENCOUNTER — TELEPHONE (OUTPATIENT)
Dept: FAMILY MEDICINE CLINIC | Age: 45
End: 2020-09-29

## 2020-09-29 NOTE — TELEPHONE ENCOUNTER
----- Message from José Kendall sent at 9/29/2020  9:24 AM EDT -----  Subject: Appointment Request    Reason for Call: Urgent (Patient Request) Physical Exam    QUESTIONS  Type of Appointment? Established Patient  Reason for appointment request? No appointments available during search  Additional Information for Provider?   ---------------------------------------------------------------------------  --------------  CALL BACK INFO  What is the best way for the office to contact you? OK to leave message on   voicemail  Preferred Call Back Phone Number? [phone]  ---------------------------------------------------------------------------  --------------  SCRIPT ANSWERS  Relationship to Patient? Self  Appointment reason? Well Care/Follow Ups  Select a Well Care/Follow Ups appointment reason? Adult Physical Exam   [Medicare Annual Wellness   AWV   PAP   Pelvic]  (Is the patient requesting to be seen urgently for their symptoms?)? Yes  (If the patient is female   ask this question) Are you requesting a pap smear with your physical   exam? No  (Patient is Medicare and requesting Annual Wellness Visit)? No  Have you been diagnosed with   tested for   or told that you are suspected of having COVID-19 (Coronavirus)? No  Have you had a fever or taken medication to treat a fever within the past   3 days? No  Have you had a cough   shortness of breath or flu-like symptoms within the past 3 days? No  Do you currently have flu-like symptoms including fever or chills   cough   shortness of breath   or difficulty breathing   or new loss of taste or smell? No  (Service Expert  click yes below to proceed with [employer] As Usual   Scheduling)?  Yes

## 2020-10-09 ENCOUNTER — OFFICE VISIT (OUTPATIENT)
Dept: FAMILY MEDICINE CLINIC | Age: 45
End: 2020-10-09
Payer: MEDICAID

## 2020-10-09 VITALS
OXYGEN SATURATION: 97 % | BODY MASS INDEX: 31.38 KG/M2 | WEIGHT: 225 LBS | HEART RATE: 74 BPM | RESPIRATION RATE: 16 BRPM | DIASTOLIC BLOOD PRESSURE: 70 MMHG | SYSTOLIC BLOOD PRESSURE: 128 MMHG

## 2020-10-09 LAB
A/G RATIO: 1.7 (ref 1.1–2.2)
ALBUMIN SERPL-MCNC: 4.1 G/DL (ref 3.4–5)
ALP BLD-CCNC: 102 U/L (ref 40–129)
ALT SERPL-CCNC: 12 U/L (ref 10–40)
ANION GAP SERPL CALCULATED.3IONS-SCNC: 9 MMOL/L (ref 3–16)
AST SERPL-CCNC: 10 U/L (ref 15–37)
BILIRUB SERPL-MCNC: 0.5 MG/DL (ref 0–1)
BUN BLDV-MCNC: 11 MG/DL (ref 7–20)
CALCIUM SERPL-MCNC: 9.6 MG/DL (ref 8.3–10.6)
CHLORIDE BLD-SCNC: 103 MMOL/L (ref 99–110)
CHOLESTEROL, TOTAL: 160 MG/DL (ref 0–199)
CO2: 28 MMOL/L (ref 21–32)
CREAT SERPL-MCNC: 0.8 MG/DL (ref 0.9–1.3)
GFR AFRICAN AMERICAN: >60
GFR NON-AFRICAN AMERICAN: >60
GLOBULIN: 2.4 G/DL
GLUCOSE BLD-MCNC: 161 MG/DL (ref 70–99)
HDLC SERPL-MCNC: 31 MG/DL (ref 40–60)
LDL CHOLESTEROL CALCULATED: 113 MG/DL
POTASSIUM SERPL-SCNC: 4.2 MMOL/L (ref 3.5–5.1)
SODIUM BLD-SCNC: 140 MMOL/L (ref 136–145)
TOTAL PROTEIN: 6.5 G/DL (ref 6.4–8.2)
TRIGL SERPL-MCNC: 79 MG/DL (ref 0–150)
VLDLC SERPL CALC-MCNC: 16 MG/DL

## 2020-10-09 PROCEDURE — 2022F DILAT RTA XM EVC RTNOPTHY: CPT | Performed by: INTERNAL MEDICINE

## 2020-10-09 PROCEDURE — 90471 IMMUNIZATION ADMIN: CPT | Performed by: INTERNAL MEDICINE

## 2020-10-09 PROCEDURE — 1036F TOBACCO NON-USER: CPT | Performed by: INTERNAL MEDICINE

## 2020-10-09 PROCEDURE — 99214 OFFICE O/P EST MOD 30 MIN: CPT | Performed by: INTERNAL MEDICINE

## 2020-10-09 PROCEDURE — G8417 CALC BMI ABV UP PARAM F/U: HCPCS | Performed by: INTERNAL MEDICINE

## 2020-10-09 PROCEDURE — 90686 IIV4 VACC NO PRSV 0.5 ML IM: CPT | Performed by: INTERNAL MEDICINE

## 2020-10-09 PROCEDURE — 3046F HEMOGLOBIN A1C LEVEL >9.0%: CPT | Performed by: INTERNAL MEDICINE

## 2020-10-09 PROCEDURE — G8482 FLU IMMUNIZE ORDER/ADMIN: HCPCS | Performed by: INTERNAL MEDICINE

## 2020-10-09 PROCEDURE — G8427 DOCREV CUR MEDS BY ELIG CLIN: HCPCS | Performed by: INTERNAL MEDICINE

## 2020-10-09 PROCEDURE — 36415 COLL VENOUS BLD VENIPUNCTURE: CPT | Performed by: INTERNAL MEDICINE

## 2020-10-09 RX ORDER — IBUPROFEN 600 MG/1
600 TABLET ORAL 3 TIMES DAILY PRN
Qty: 30 TABLET | Refills: 0 | Status: SHIPPED | OUTPATIENT
Start: 2020-10-09 | End: 2020-11-12 | Stop reason: SDUPTHER

## 2020-10-09 ASSESSMENT — PATIENT HEALTH QUESTIONNAIRE - PHQ9
2. FEELING DOWN, DEPRESSED OR HOPELESS: 0
SUM OF ALL RESPONSES TO PHQ QUESTIONS 1-9: 0
SUM OF ALL RESPONSES TO PHQ QUESTIONS 1-9: 0
SUM OF ALL RESPONSES TO PHQ9 QUESTIONS 1 & 2: 0
1. LITTLE INTEREST OR PLEASURE IN DOING THINGS: 0

## 2020-10-09 ASSESSMENT — ENCOUNTER SYMPTOMS
ABDOMINAL PAIN: 0
BACK PAIN: 0
SHORTNESS OF BREATH: 0

## 2020-10-09 NOTE — PROGRESS NOTES
10/9/2020     Mikki Chavez (:  1975) is a 41 y.o. male, here for evaluation of the following medical concerns:    Diabetes   Pertinent negatives for diabetes include no chest pain. Hand Pain    Pertinent negatives include no chest pain or numbness. Other   Pertinent negatives include no abdominal pain, chest pain, joint swelling, myalgias or numbness. Presents for routine follow-up care of his type 2 diabetes mellitus, hypertension, hyperlipidemia. He remains on his same insulin regimen. He has been doing well , no hypoglcyemia. glycemic control has been good but he is overdue for A1c. He does see an ophthalmologist regularly and denies any retinopathy, states he is planning to make a follow-up in December. He remains on ACE inhibitor and denies any recent chest pain or shortness of breath, edema. Remains on statin which she is tolerating well-no abdominal pain or myopathy. However, he has been having pain in his right heel. Has been present for approximately 2 months. He had been walking a lot and had been wearing flip-flops when it first started. He did go to the ER for this. He has not been doing anything for the pain but he is wearing better shoes. He reports the pain is worse when he first wakes up in the morning. He does spend a lot of time on his feet working at the [employer]. He also has a trigger finger in his right hand that has been an issue for him for over a year. He states he was referred previously to the hand specialist but never made it to an appointment. He would like to have his flu shot today    Review of Systems   Constitutional: Negative for unexpected weight change. Respiratory: Negative for shortness of breath. Cardiovascular: Negative for chest pain, palpitations and leg swelling. Gastrointestinal: Negative for abdominal pain. Musculoskeletal: Positive for gait problem. Negative for back pain, joint swelling and myalgias.    Neurological: Negative for numbness. Prior to Visit Medications    Medication Sig Taking?  Authorizing Provider   ibuprofen (ADVIL;MOTRIN) 600 MG tablet Take 1 tablet by mouth 3 times daily as needed for Pain Yes Merlene Salazar MD   atorvastatin (LIPITOR) 40 MG tablet TAKE 1 TABLET BY MOUTH DAILY Yes Merlene Salazar MD   BASAGLAR KWIKPEN 100 UNIT/ML injection pen INJECT [address] Yes Merlene Salazar MD   lisinopril (PRINIVIL;ZESTRIL) 5 MG tablet TAKE 1 TABLET BY MOUTH DAILY Yes Merlene Salazar MD   Blood Glucose Monitoring Suppl (ONE TOUCH ULTRA 2) w/Device KIT 1 kit by Does not apply route daily Yes Merlene Salazar MD   ONE TOUCH ULTRA TEST strip TEST FOUR TIMES DAILY AS NEEDED Yes Merlene Salazar MD   BD PEN NEEDLE VIBHA U/F 32G X 4 MM MISC USE AS DIRECTED FOUR TIMES DAILY Yes Merlene Salazar MD   omeprazole (PRILOSEC) 40 MG delayed release capsule TAKE 1 CAPSULE BY MOUTH DAILY Yes Merlene Salazar MD   baclofen (LIORESAL) 10 MG tablet TAKE 1 TABLET BY MOUTH TWICE DAILY Yes Merlene Salazar MD   methocarbamol (ROBAXIN-750) 750 MG tablet Take 1 tablet by mouth 2 times daily  Patient not taking: Reported on 10/9/2020  Shay Russo PA-C   insulin lispro, 1 Unit Dial, (HUMALOG KWIKPEN) 100 UNIT/ML SOPN Inject 30 Units into the skin 3 times daily (before meals) Generic lispro  Patient not taking: Reported on 10/9/2020  Merlene Salazar MD   indomethacin (INDOCIN) 50 MG capsule Take 50 mg by mouth 3 times daily  Historical Provider, MD   Alcohol Swabs 70 % PADS Apply 1 Units topically 4 times daily  Patient not taking: Reported on 10/9/2020  Merlene Salazar MD        Social History     Tobacco Use    Smoking status: Former Smoker    Smokeless tobacco: Former User     Quit date: 1/8/2007   Substance Use Topics    Alcohol use: No     Comment: occasional        Vitals:    10/09/20 0957   BP: 128/70   Pulse: 74   Resp: 16   SpO2: 97%   Weight: 225 lb (102.1 kg)     Estimated body mass index is 31.38 kg/m² as calculated from the following:    Height as of 9/8/20: 5' 11\" (1.803 m). Weight as of this encounter: 225 lb (102.1 kg). Physical Exam  Constitutional:       General: He is not in acute distress. Appearance: He is well-developed. Cardiovascular:      Rate and Rhythm: Normal rate and regular rhythm. Heart sounds: Normal heart sounds. No murmur. Pulmonary:      Effort: Pulmonary effort is normal. No respiratory distress. Breath sounds: Normal breath sounds. No wheezing or rales. Abdominal:      General: Bowel sounds are normal.      Palpations: Abdomen is soft. Tenderness: There is no abdominal tenderness. Musculoskeletal:         General: Tenderness present. Comments: Normal foot exam , no ulcerations or breakdown of skin  ttp over right heel. Full rom of ankle     Skin:     General: Skin is warm and dry. Capillary Refill: Capillary refill takes less than 2 seconds. Findings: No rash. ASSESSMENT/PLAN:  1. Type 2 diabetes mellitus with diabetic neuropathy, with long-term current use of insulin ([address]  Overdue for labs, follow-up today. Continue present management pending results. Make appointment with eye doctor as planned. Continue monitoring feet daily  - Comprehensive Metabolic Panel  - Hemoglobin A1C    2. Trigger ring finger of right hand  - Timi Balbuena MD, Hand Surgery (Hand, Wrist, Upper Extremity), [facility]    3. Plantar fasciitis of right foot  Discussed management with anti-inflammatories and ice application, exercises and information given for conservative management. If he does not improve, will refer to podiatry. Discussed importance of proper footwear    4. Essential hypertension  At goal continue present management  - Comprehensive Metabolic Panel    5. Hyperlipidemia, unspecified hyperlipidemia type  Continue statin  - Lipid Panel    6.  Needs flu shot    - Influenza, Quadv, 3 yrs and older, IM, PF, Prefill Syr or SDV, 0.5mL (AFLURIA QUADV, PF)        Return in about 4 months (around 2/9/2021). An electronic signature was used to authenticate this note.     --Brenton Soto MD on 10/9/2020 at 10:57 AM

## 2020-10-09 NOTE — PATIENT INSTRUCTIONS
Patient Education        Plantar Fasciitis: Care Instructions  Your Care Instructions     Plantar fasciitis is pain and inflammation of the plantar fascia, the tissue at the bottom of your foot that connects the heel bone to the toes. The plantar fascia also supports the arch. If you strain the plantar fascia, it can develop small tears and cause heel pain when you stand or walk. Plantar fasciitis can be caused by running or other sports. It also may occur in people who are overweight or who have high arches or flat feet. You may get plantar fasciitis if you walk or stand for long periods, or have a tight Achilles tendon or calf muscles. You can improve your foot pain with rest and other care at home. It might take a few weeks to a few months for your foot to heal completely. Follow-up care is a key part of your treatment and safety. Be sure to make and go to all appointments, and call your doctor if you are having problems. It's also a good idea to know your test results and keep a list of the medicines you take. How can you care for yourself at home? · Rest your feet often. Reduce your activity to a level that lets you avoid pain. If possible, do not run or walk on hard surfaces. · Take pain medicines exactly as directed. ? If the doctor gave you a prescription medicine for pain, take it as prescribed. ? If you are not taking a prescription pain medicine, take an over-the-counter anti-inflammatory medicine for pain and swelling, such as ibuprofen (Advil, Motrin) or naproxen (Aleve). Read and follow all instructions on the label. · Use ice massage to help with pain and swelling. You can use an ice cube or an ice cup several times a day. To make an ice cup, fill a paper cup with water and freeze it. Cut off the top of the cup until a half-inch of ice shows. Hold onto the remaining paper to use the cup. Rub the ice in small circles over the area for 5 to 7 minutes.   · Contrast baths, which alternate hot and use of this information.

## 2020-10-10 LAB
ESTIMATED AVERAGE GLUCOSE: 174.3 MG/DL
HBA1C MFR BLD: 7.7 %

## 2020-10-13 ENCOUNTER — TELEPHONE (OUTPATIENT)
Dept: FAMILY MEDICINE CLINIC | Age: 45
End: 2020-10-13

## 2020-10-13 RX ORDER — GLUCOSAMINE HCL/CHONDROITIN SU 500-400 MG
1 CAPSULE ORAL 4 TIMES DAILY
Qty: 120 EACH | Refills: 5 | Status: SHIPPED | OUTPATIENT
Start: 2020-10-13 | End: 2021-04-08 | Stop reason: SDUPTHER

## 2020-10-13 RX ORDER — INSULIN LISPRO 100 [IU]/ML
INJECTION, SOLUTION INTRAVENOUS; SUBCUTANEOUS
Qty: 15 ML | Refills: 5 | Status: SHIPPED | OUTPATIENT
Start: 2020-10-13 | End: 2021-04-08 | Stop reason: SDUPTHER

## 2020-10-13 RX ORDER — BLOOD SUGAR DIAGNOSTIC
STRIP MISCELLANEOUS
Qty: 200 STRIP | Refills: 5 | Status: SHIPPED | OUTPATIENT
Start: 2020-10-13 | End: 2021-04-08 | Stop reason: SDUPTHER

## 2020-10-13 RX ORDER — OMEPRAZOLE 40 MG/1
40 CAPSULE, DELAYED RELEASE ORAL DAILY
Qty: 30 CAPSULE | Refills: 5 | Status: SHIPPED | OUTPATIENT
Start: 2020-10-13 | End: 2021-04-08 | Stop reason: SDUPTHER

## 2020-10-13 NOTE — TELEPHONE ENCOUNTER
PT calling in for refill    ---ONE TOUCH ULTRA TEST strip     ---omeprazole (PRILOSEC) 40 MG delayed release capsule     ---insulin lispro, 1 Unit Dial, (HUMALOG KWIKPEN) 100 UNIT/ML SOPN     Send to [facility] on [address]

## 2020-10-29 ENCOUNTER — OFFICE VISIT (OUTPATIENT)
Dept: ORTHOPEDIC SURGERY | Age: 45
End: 2020-10-29
Payer: MEDICAID

## 2020-10-29 VITALS — WEIGHT: 225 LBS | TEMPERATURE: 97.3 F | HEIGHT: 71 IN | BODY MASS INDEX: 31.5 KG/M2

## 2020-10-29 PROBLEM — M65.341 TRIGGER RING FINGER OF RIGHT HAND: Status: ACTIVE | Noted: 2020-10-29

## 2020-10-29 PROBLEM — G56.01 CARPAL TUNNEL SYNDROME, RIGHT: Status: ACTIVE | Noted: 2020-10-29

## 2020-10-29 PROCEDURE — G8427 DOCREV CUR MEDS BY ELIG CLIN: HCPCS | Performed by: ORTHOPAEDIC SURGERY

## 2020-10-29 PROCEDURE — 99203 OFFICE O/P NEW LOW 30 MIN: CPT | Performed by: ORTHOPAEDIC SURGERY

## 2020-10-29 PROCEDURE — G8482 FLU IMMUNIZE ORDER/ADMIN: HCPCS | Performed by: ORTHOPAEDIC SURGERY

## 2020-10-29 PROCEDURE — 20550 NJX 1 TENDON SHEATH/LIGAMENT: CPT | Performed by: ORTHOPAEDIC SURGERY

## 2020-10-29 PROCEDURE — G8417 CALC BMI ABV UP PARAM F/U: HCPCS | Performed by: ORTHOPAEDIC SURGERY

## 2020-10-29 RX ORDER — LIDOCAINE HYDROCHLORIDE 10 MG/ML
0.5 INJECTION, SOLUTION INFILTRATION; PERINEURAL ONCE
Status: COMPLETED | OUTPATIENT
Start: 2020-10-29 | End: 2020-10-29

## 2020-10-29 RX ORDER — TRIAMCINOLONE ACETONIDE 40 MG/ML
20 INJECTION, SUSPENSION INTRA-ARTICULAR; INTRAMUSCULAR ONCE
Status: COMPLETED | OUTPATIENT
Start: 2020-10-29 | End: 2020-10-29

## 2020-10-29 RX ADMIN — TRIAMCINOLONE ACETONIDE 20 MG: 40 INJECTION, SUSPENSION INTRA-ARTICULAR; INTRAMUSCULAR at 09:03

## 2020-10-29 RX ADMIN — LIDOCAINE HYDROCHLORIDE 0.5 ML: 10 INJECTION, SOLUTION INFILTRATION; PERINEURAL at 09:03

## 2020-10-29 NOTE — PROGRESS NOTES
This 41 y.o., right hand dominant  is seen in consultation for Juventino Stauffer MD with a chief complaint of pain, swelling, stiffness and intermittant snapping of the right ring finger. Symptoms have been present for 6 months. The digit is stiff, especially in the morning and will frequently stick in the palm when flexed and pop when extended. This is frequently associated with pain. Mild swelling has been noticed. Treatment has been prescribed(ibuprofen). The problem has worsened recently. There is no history of injury or significant overuse. He also relates numbness and paresthesias in the right hand, worse at night. The pain assessment has been reviewed and is correct as stated. .    The patient's social history, past medical history, family history, medications, allergies and review of systems, entered 10/29/20, have been reviewed, and dated and are recorded in the chart. On examination the patient is Height: 5' 10.5\" (179.1 cm) tall and weighs Weight: 225 lb (102.1 kg). Respirations are 18 per minute. The patient is well nourished, is oriented to time and place, demonstrates appropriate mood and affect as well as normal gait and station. There is mild soft tissue swelling of the digit. There is no deformity. There is tenderness, thickening and nodularity at the base of the flexor tendon sheath. Range of motion is slightly limited in flexion and extension. The digit sticks in flexion and pops into extension, accompanied by some pain. Skin is intact without lesions. Distal circulation and sensation are intact. Muscle strength and coordination are normal.  Reflexes are present bilaterally. Joints are stable. There are no subcutaneous nodules or enlarged epitrochlear lymph nodes. Impression: Right ring finger trigger digit. Right carpal tunnel syndrome, by history.     The nature of these related medical problems is fully discussed with the patient, including all treatment options. All questions are answered. The nature of this medical problem is fully discussed with the patient, including all treatment options. All questions are answered. The right  hand is prepared with Betadine and alcohol and the flexor tendon sheath of the right ring finger is injected with 1/2 milliliter of 1% lidocaine and 20 mg.of triamcinalone, with good filling. The patient is advised regarding the expected response and possible reactions from the injection. The possibility of recurrence of the problem is discussed. He will purchase a carpal tunnel wrist brace and sleep in it each night for the next month. He is asked to call me one month from now to report how his ring finger and his hand paresthesias are doing.

## 2020-11-09 RX ORDER — INSULIN GLARGINE 100 [IU]/ML
INJECTION, SOLUTION SUBCUTANEOUS
Qty: 15 ML | Refills: 0 | Status: SHIPPED | OUTPATIENT
Start: 2020-11-09 | End: 2020-12-07

## 2020-11-12 RX ORDER — IBUPROFEN 600 MG/1
600 TABLET ORAL 3 TIMES DAILY PRN
Qty: 30 TABLET | Refills: 1 | Status: SHIPPED | OUTPATIENT
Start: 2020-11-12 | Stop reason: SDUPTHER

## 2020-11-12 RX ORDER — PEN NEEDLE, DIABETIC 32GX 5/32"
NEEDLE, DISPOSABLE MISCELLANEOUS
Qty: 100 EACH | Refills: 5 | Status: SHIPPED | OUTPATIENT
Start: 2020-11-12 | End: 2021-09-01 | Stop reason: SDUPTHER

## 2020-11-30 ENCOUNTER — TELEPHONE (OUTPATIENT)
Dept: ORTHOPEDIC SURGERY | Age: 45
End: 2020-11-30

## 2020-12-07 RX ORDER — INSULIN GLARGINE 100 [IU]/ML
INJECTION, SOLUTION SUBCUTANEOUS
Qty: 15 ML | Refills: 3 | Status: SHIPPED | OUTPATIENT
Start: 2020-12-07 | End: 2021-04-19 | Stop reason: SDUPTHER

## 2020-12-10 RX ORDER — LANCETS 28 GAUGE
100 EACH MISCELLANEOUS 3 TIMES DAILY
Qty: 100 EACH | Refills: 6 | Status: SHIPPED | OUTPATIENT
Start: 2020-12-10 | End: 2021-04-08 | Stop reason: SDUPTHER

## 2020-12-21 ENCOUNTER — OFFICE VISIT (OUTPATIENT)
Dept: ORTHOPEDIC SURGERY | Age: 45
End: 2020-12-21
Payer: MEDICAID

## 2020-12-21 VITALS — WEIGHT: 225 LBS | BODY MASS INDEX: 31.5 KG/M2 | TEMPERATURE: 98.1 F | HEIGHT: 71 IN

## 2020-12-21 PROCEDURE — 99212 OFFICE O/P EST SF 10 MIN: CPT | Performed by: ORTHOPAEDIC SURGERY

## 2020-12-21 PROCEDURE — G8482 FLU IMMUNIZE ORDER/ADMIN: HCPCS | Performed by: ORTHOPAEDIC SURGERY

## 2020-12-21 PROCEDURE — G8427 DOCREV CUR MEDS BY ELIG CLIN: HCPCS | Performed by: ORTHOPAEDIC SURGERY

## 2020-12-21 PROCEDURE — 1036F TOBACCO NON-USER: CPT | Performed by: ORTHOPAEDIC SURGERY

## 2020-12-21 PROCEDURE — G8417 CALC BMI ABV UP PARAM F/U: HCPCS | Performed by: ORTHOPAEDIC SURGERY

## 2021-01-06 ENCOUNTER — TELEPHONE (OUTPATIENT)
Dept: FAMILY MEDICINE CLINIC | Age: 46
End: 2021-01-06

## 2021-01-06 NOTE — TELEPHONE ENCOUNTER
PT called in wanting to make an appointment. Per PT he states that for a couple of months now he's been experiencing pain in his left foot. He says that the heel hurts constantly and when he gets up from laying down or sitting he's limping and experiencing numbness as well. Pt would like to be seen for this. He also states he needs a refill for his humalog, basaglar and his test strips. Please send to [facility] on [address].      Best call back number: [phone]

## 2021-01-07 ENCOUNTER — TELEPHONE (OUTPATIENT)
Dept: FAMILY MEDICINE CLINIC | Age: 46
End: 2021-01-07

## 2021-01-07 DIAGNOSIS — M79.672 CHRONIC FOOT PAIN, LEFT: Primary | ICD-10-CM

## 2021-01-07 DIAGNOSIS — G89.29 CHRONIC FOOT PAIN, LEFT: Primary | ICD-10-CM

## 2021-01-07 NOTE — TELEPHONE ENCOUNTER
----- Message from Olive Lawson sent at 1/7/2021  9:08 AM EST -----  Subject: Appointment Request    Reason for Call: Routine (Patient Request) No Script    QUESTIONS  Type of Appointment? Established Patient  Reason for appointment request? No appointments available during search  Additional Information for Provider? patient needs to be seen for his foot   cannot make appointment 1/8 because of transporation. needs appointment   after wed of next week. please call him back  ---------------------------------------------------------------------------  --------------  CALL BACK INFO  What is the best way for the office to contact you? OK to leave message on   voicemail  Preferred Call Back Phone Number? [phone]  ---------------------------------------------------------------------------  --------------  SCRIPT ANSWERS  Relationship to Patient? Self  Have your symptoms changed? No  Appointment reason? Symptomatic  Select script based on patient symptoms? Adult No Script  (Is the patient requesting to see the provider for a procedure?)? No  (Is the patient requesting to see the provider urgently  today or   tomorrow. )? No  Have you been diagnosed with   tested for   or told that you are suspected of having COVID-19 (Coronavirus)? No  Have you had a fever or taken medication to treat a fever within the past   3 days? No  Have you had a cough   shortness of breath or flu-like symptoms within the past 3 days? No  Do you currently have flu-like symptoms including fever or chills   cough   shortness of breath   or difficulty breathing   or new loss of taste or smell? No  (Service Expert  click yes below to proceed with [employer] As Usual   Scheduling)?  Yes

## 2021-01-07 NOTE — TELEPHONE ENCOUNTER
Spoke with PT, he is a diabetic, when he wakes up in the AM he has no feeling in Left Foot. PT notices that he has been limping. PT c/o heel pain, as week.  Top of foot no longer causing   Overall duration : 6 months

## 2021-01-08 NOTE — TELEPHONE ENCOUNTER
I would like him to start with podiatrist and sched f/u Dr. Rd Oshea    Ref Dr. Debby Islas assist scheduling      Lab Results   Component Value Date    LABA1C 7.7 10/09/2020     Lab Results   Component Value Date    .3 10/09/2020

## 2021-01-11 NOTE — TELEPHONE ENCOUNTER
PT called in regarding message. Gave PT the information to Dr. Destin Dutta. Pt will call back to schedule with Dr. Kin Wade once he reaches out to Dr. Jairo Roach office.

## 2021-04-05 PROCEDURE — 96374 THER/PROPH/DIAG INJ IV PUSH: CPT

## 2021-04-05 PROCEDURE — 99283 EMERGENCY DEPT VISIT LOW MDM: CPT

## 2021-04-05 PROCEDURE — 93005 ELECTROCARDIOGRAM TRACING: CPT | Performed by: EMERGENCY MEDICINE

## 2021-04-05 PROCEDURE — 96375 TX/PRO/DX INJ NEW DRUG ADDON: CPT

## 2021-04-05 ASSESSMENT — PAIN DESCRIPTION - PAIN TYPE: TYPE: ACUTE PAIN

## 2021-04-06 ENCOUNTER — APPOINTMENT (OUTPATIENT)
Dept: CT IMAGING | Age: 46
End: 2021-04-06
Payer: MEDICAID

## 2021-04-06 ENCOUNTER — HOSPITAL ENCOUNTER (EMERGENCY)
Age: 46
Discharge: HOME | End: 2021-04-06
Attending: EMERGENCY MEDICINE
Payer: MEDICAID

## 2021-04-06 ENCOUNTER — APPOINTMENT (OUTPATIENT)
Dept: GENERAL RADIOLOGY | Age: 46
End: 2021-04-06
Payer: MEDICAID

## 2021-04-06 VITALS
DIASTOLIC BLOOD PRESSURE: 85 MMHG | BODY MASS INDEX: 32.21 KG/M2 | HEART RATE: 56 BPM | HEIGHT: 70 IN | SYSTOLIC BLOOD PRESSURE: 143 MMHG | WEIGHT: 225 LBS | RESPIRATION RATE: 19 BRPM | TEMPERATURE: 97.1 F | OXYGEN SATURATION: 97 %

## 2021-04-06 DIAGNOSIS — R07.9 CHEST PAIN, UNSPECIFIED TYPE: Primary | ICD-10-CM

## 2021-04-06 LAB
A/G RATIO: 1.3 (ref 1.1–2.2)
ALBUMIN SERPL-MCNC: 4.3 G/DL (ref 3.4–5)
ALP BLD-CCNC: 107 U/L (ref 40–129)
ALT SERPL-CCNC: 18 U/L (ref 10–40)
AMYLASE: 96 U/L (ref 25–115)
ANION GAP SERPL CALCULATED.3IONS-SCNC: 11 MMOL/L (ref 3–16)
AST SERPL-CCNC: 17 U/L (ref 15–37)
BASOPHILS ABSOLUTE: 0.1 K/UL (ref 0–0.2)
BASOPHILS RELATIVE PERCENT: 1 %
BILIRUB SERPL-MCNC: 0.4 MG/DL (ref 0–1)
BUN BLDV-MCNC: 10 MG/DL (ref 7–20)
CALCIUM SERPL-MCNC: 9.2 MG/DL (ref 8.3–10.6)
CHLORIDE BLD-SCNC: 103 MMOL/L (ref 99–110)
CO2: 25 MMOL/L (ref 21–32)
CREAT SERPL-MCNC: 0.8 MG/DL (ref 0.9–1.3)
EKG ATRIAL RATE: 69 BPM
EKG DIAGNOSIS: NORMAL
EKG P AXIS: 64 DEGREES
EKG P-R INTERVAL: 154 MS
EKG Q-T INTERVAL: 392 MS
EKG QRS DURATION: 88 MS
EKG QTC CALCULATION (BAZETT): 420 MS
EKG R AXIS: -16 DEGREES
EKG T AXIS: 53 DEGREES
EKG VENTRICULAR RATE: 69 BPM
EOSINOPHILS ABSOLUTE: 0.2 K/UL (ref 0–0.6)
EOSINOPHILS RELATIVE PERCENT: 2.4 %
GFR AFRICAN AMERICAN: >60
GFR NON-AFRICAN AMERICAN: >60
GLOBULIN: 3.4 G/DL
GLUCOSE BLD-MCNC: 192 MG/DL (ref 70–99)
HCT VFR BLD CALC: 41.9 % (ref 40.5–52.5)
HEMOGLOBIN: 13.6 G/DL (ref 13.5–17.5)
LIPASE: 39 U/L (ref 13–60)
LYMPHOCYTES ABSOLUTE: 2.6 K/UL (ref 1–5.1)
LYMPHOCYTES RELATIVE PERCENT: 27.6 %
MCH RBC QN AUTO: 32.3 PG (ref 26–34)
MCHC RBC AUTO-ENTMCNC: 32.4 G/DL (ref 31–36)
MCV RBC AUTO: 99.6 FL (ref 80–100)
MONOCYTES ABSOLUTE: 0.7 K/UL (ref 0–1.3)
MONOCYTES RELATIVE PERCENT: 7.8 %
NEUTROPHILS ABSOLUTE: 5.7 K/UL (ref 1.7–7.7)
NEUTROPHILS RELATIVE PERCENT: 61.2 %
PDW BLD-RTO: 13.3 % (ref 12.4–15.4)
PLATELET # BLD: 185 K/UL (ref 135–450)
PLATELET SLIDE REVIEW: ADEQUATE
PMV BLD AUTO: 10.6 FL (ref 5–10.5)
POTASSIUM REFLEX MAGNESIUM: 4.6 MMOL/L (ref 3.5–5.1)
PRO-BNP: 19 PG/ML (ref 0–124)
RBC # BLD: 4.21 M/UL (ref 4.2–5.9)
SLIDE REVIEW: ABNORMAL
SODIUM BLD-SCNC: 139 MMOL/L (ref 136–145)
TOTAL PROTEIN: 7.7 G/DL (ref 6.4–8.2)
TROPONIN: <0.01 NG/ML
TROPONIN: <0.01 NG/ML
WBC # BLD: 9.3 K/UL (ref 4–11)

## 2021-04-06 PROCEDURE — 93010 ELECTROCARDIOGRAM REPORT: CPT | Performed by: INTERNAL MEDICINE

## 2021-04-06 PROCEDURE — 84484 ASSAY OF TROPONIN QUANT: CPT

## 2021-04-06 PROCEDURE — 71045 X-RAY EXAM CHEST 1 VIEW: CPT

## 2021-04-06 PROCEDURE — 6360000004 HC RX CONTRAST MEDICATION: Performed by: EMERGENCY MEDICINE

## 2021-04-06 PROCEDURE — 80053 COMPREHEN METABOLIC PANEL: CPT

## 2021-04-06 PROCEDURE — 82150 ASSAY OF AMYLASE: CPT

## 2021-04-06 PROCEDURE — 71260 CT THORAX DX C+: CPT

## 2021-04-06 PROCEDURE — 99283 EMERGENCY DEPT VISIT LOW MDM: CPT

## 2021-04-06 PROCEDURE — 85025 COMPLETE CBC W/AUTO DIFF WBC: CPT

## 2021-04-06 PROCEDURE — 6360000002 HC RX W HCPCS: Performed by: EMERGENCY MEDICINE

## 2021-04-06 PROCEDURE — 96374 THER/PROPH/DIAG INJ IV PUSH: CPT

## 2021-04-06 PROCEDURE — 83880 ASSAY OF NATRIURETIC PEPTIDE: CPT

## 2021-04-06 PROCEDURE — 96375 TX/PRO/DX INJ NEW DRUG ADDON: CPT

## 2021-04-06 PROCEDURE — 83690 ASSAY OF LIPASE: CPT

## 2021-04-06 RX ORDER — MORPHINE SULFATE 4 MG/ML
4 INJECTION, SOLUTION INTRAMUSCULAR; INTRAVENOUS ONCE
Status: COMPLETED | OUTPATIENT
Start: 2021-04-06 | End: 2021-04-06

## 2021-04-06 RX ORDER — KETOROLAC TROMETHAMINE 30 MG/ML
15 INJECTION, SOLUTION INTRAMUSCULAR; INTRAVENOUS ONCE
Status: COMPLETED | OUTPATIENT
Start: 2021-04-06 | End: 2021-04-06

## 2021-04-06 RX ORDER — KETOROLAC TROMETHAMINE 10 MG/1
10 TABLET, FILM COATED ORAL EVERY 6 HOURS PRN
Qty: 20 TABLET | Refills: 0 | Status: SHIPPED | OUTPATIENT
Start: 2021-04-06 | End: 2022-03-17 | Stop reason: SDUPTHER

## 2021-04-06 RX ADMIN — IOPAMIDOL 75 ML: 755 INJECTION, SOLUTION INTRAVENOUS at 03:47

## 2021-04-06 RX ADMIN — MORPHINE SULFATE 4 MG: 4 INJECTION, SOLUTION INTRAMUSCULAR; INTRAVENOUS at 03:02

## 2021-04-06 RX ADMIN — KETOROLAC TROMETHAMINE 15 MG: 30 INJECTION, SOLUTION INTRAMUSCULAR at 05:11

## 2021-04-06 ASSESSMENT — PAIN SCALES - GENERAL: PAINLEVEL_OUTOF10: 8

## 2021-04-06 NOTE — ED NOTES
Bed: 18  Expected date:   Expected time:   Means of arrival: Walk In  Comments:     Debby Rose RN  04/06/21 7093

## 2021-04-06 NOTE — ED PROVIDER NOTES
[facility]        Pt Name: Cassidy Ramos  MRN: [id number]  [place] 1975  Date of evaluation: 4/5/2021  Provider: Rehan Osman MD  PCP: Rita Rahman MD    This patient was seen and evaluated by the attending physician Rehan Osman MD.      [address] [place] [address]       Chief Complaint   Patient presents with    Chest Pain     tight pain in my heart, pressure when he breathes started yesterday. HISTORY OF PRESENT ILLNESS   (Location/Symptom, Timing/Onset, Context/Setting, Quality, Duration, Modifying Factors, Severity)  Note limiting factors. Cassidy Ramos is a 41 y.o. male here today with a chief complaint of chest pain for the past day. He states that he began having some tight chest squeezing sensations yesterday afternoon after he coughed forcefully. No dyspnea no palpitations just hard to take a deep breath then. No nausea or diarrhea. Does have a history of hypertension dyslipidemia and insulin dependent diabetes. Nursing Notes were all reviewed and agreed with or any disagreements were addressed  in the HPI. REVIEW OF SYSTEMS    (2-9 systems for level 4, 10 or more for level 5)     Review of Systems    Positives and Pertinent negatives as per HPI. Except as noted abovein the ROS, all other systems were reviewed and negative.        PAST MEDICAL HISTORY     Past Medical History:   Diagnosis Date    Diabetes mellitus ([address]     Hyperlipidemia     Hypertension     Neuropathy     Psychiatric problem     depression         SURGICAL HISTORY     Past Surgical History:   Procedure Laterality Date    HIP SURGERY  01/2013    OTHER SURGICAL HISTORY Right 1/14/2013    excision of mass right hip         CURRENTMEDICATIONS       Previous Medications    ALCOHOL SWABS 70 % PADS    Apply 1 Units topically 4 times daily    ATORVASTATIN (LIPITOR) 40 MG TABLET    TAKE 1 TABLET BY MOUTH DAILY    BACLOFEN (LIORESAL) 10 together: None     Attends Sikh service: None     Active member of club or organization: None     Attends meetings of clubs or organizations: None     Relationship status: None    Intimate partner violence     Fear of current or ex partner: None     Emotionally abused: None     Physically abused: None     Forced sexual activity: None   Other Topics Concern    None   Social History Narrative    None       SCREENINGS             PHYSICAL EXAM    (up to 7 for level 4, 8 or more for level 5)     ED Triage Vitals [04/05/21 2300]   BP Temp Temp Source Pulse Resp SpO2 Height Weight   133/78 97.1 °F (36.2 °C) Temporal 73 16 99 % 5' 10\" (1.778 m) 225 lb (102.1 kg)       Physical Exam     General Appearance:  Alert, cooperative, no distress, appears stated age. Head:  Normocephalic, without obvious abnormality, atraumatic. Eyes:  conjunctiva/corneas clear, EOM's intact. Sclera anicteric. ENT: Mucous membranes moist.   Neck: Supple, symmetrical, trachea midline, no adenopathy. No jugular venous distention. Lungs:   No Respiratory Distress. Chest Wall:  Atraumatic   Heart:  RRR no m/c/g/r   Abdomen:   Soft, Nt, ND   Extremities:  Full range of motion. Pulses: Symmetric x4   Skin:  No rashes or lesions to exposed skin. Neurologic: Alert and oriented X 3. Motor grossly normal.  Speech clear.           DIAGNOSTIC RESULTS   LABS:    Labs Reviewed   CBC WITH AUTO DIFFERENTIAL - Abnormal; Notable for the following components:       Result Value    MPV 10.6 (*)     All other components within normal limits    Narrative:     Performed at:  [facility] [address], [place], [address]   Phone [phone]   COMPREHENSIVE METABOLIC PANEL W/ REFLEX TO MG FOR LOW K - Abnormal; Notable for the following components:    Glucose 192 (*)     CREATININE 0.8 (*)     All other components within normal limits    Narrative:     Performed at:  [facility]  [address], [address]   Phone [phone]   LIPASE    Narrative:     Performed at:  [facility]  [address], [address]   Phone [phone]   AMYLASE    Narrative:     Performed at:  [facility]  [address], [address]   Phone [phone]   TROPONIN    Narrative:     Performed at:  [facility]  [address], [address]   Phone [phone]   BRAIN NATRIURETIC PEPTIDE    Narrative:     Performed at:  [facility]  [address] [place], [address]   Phone [phone]   TROPONIN    Narrative:     Performed at:  [facility]  [address], [address]   Phone [phone]       All other labs were within normal range or not returned as of this dictation. EKG: All EKG's are interpreted by the Emergency Department Physician in the absence of a cardiologist.  Please see their note for interpretation of EKG. EKG is reviewed by myself. Dated today at 2258. Rate 69. Normal sinus rhythm. Normal EKG. Compared to November 2018 there has been resolution of some T wave inversions. Rosalba Rodriguez MD  04/05/21 3663         RADIOLOGY:   Non-plain film images such as CT, Ultrasound and MRI are read by the radiologist. Plain radiographic images are visualized andpreliminarily interpreted by the  ED Provider with the below findings:        Interpretation [facility] Radiologist below, if available at the time of this note:    CT CHEST PULMONARY EMBOLISM W CONTRAST   Final Result   No evidence of pulmonary embolism or acute pulmonary abnormality. Indeterminate liver lesions. Liver protocol MRI is recommended for further   evaluation. XR CHEST PORTABLE   Final Result   No acute cardiopulmonary process. No results found.         PROCEDURES   Unless otherwise noted below, none     Procedures    CRITICAL CARE TIME   N/A    CONSULTS:  None      EMERGENCY DEPARTMENT COURSE and DIFFERENTIAL DIAGNOSIS/MDM:   Vitals:    Vitals:    04/06/21 0330 04/06/21 0400 04/06/21 0415 04/06/21 0430   BP: (!) 146/97   (!) 143/85   Pulse: 62 60 55 56   Resp: 20 23 19 19   Temp:       TempSrc:       SpO2: 97% 97% 98% 97%   Weight:       Height:           Patient was given thefollowing medications:  Medications   morphine injection 4 mg (4 mg Intravenous Given 4/6/21 0302)   iopamidol (ISOVUE-370) 76 % injection 75 mL (75 mLs Intravenous Given 4/6/21 1237)   ketorolac (TORADOL) injection 15 mg (15 mg Intravenous Given 4/6/21 3225)       69-year-old male with cardiac risk factors here today with chief complaint of chest pain for the past day. EKG without ischemic changes. Checking labs and CT of chest.    CT angio negative. Labs are benign as well. Heart score of 2 points. Reassessed; pain improved. Will check 2nd trop as per protocol. If 2nd trop negative, DC with cards followup. Second trop negative. Will DC home. FINAL IMPRESSION      1. Chest pain, unspecified type          DISPOSITION/PLAN   DISPOSITION        PATIENT REFERREDTO:  Charles Brambila MD  [address] [place] [address].  [facility] [address]    Call in 1 day      [address] Kaden Mckeon MD  [address] [place] [address]  [phone]            DISCHARGE MEDICATIONS:  New Prescriptions    KETOROLAC (TORADOL) 10 MG TABLET    Take 1 tablet by mouth every 6 hours as needed for Pain       DISCONTINUED MEDICATIONS:  Discontinued Medications    INDOMETHACIN (INDOCIN) 50 MG CAPSULE    Take 50 mg by mouth 3 times daily    METHOCARBAMOL (ROBAXIN-750) 750 MG TABLET    Take 1 tablet by mouth 2 times daily              (Please note that portions ofthis note were completed with a voice recognition program.  Efforts were made to edit the dictations but occasionally words are mis-transcribed.)    Monserrat Marrero MD (electronically signed)           Monserrat Marrero MD  04/06/21 3980

## 2021-04-06 NOTE — ED NOTES
Pt in bed, respirations easy, even, and unlabored, pt sinus olman on the monitor, pt alert and oriented, no s/s of distress.       Josephine Gastelum RN  04/06/21 7829

## 2021-04-08 ENCOUNTER — OFFICE VISIT (OUTPATIENT)
Dept: FAMILY MEDICINE CLINIC | Age: 46
End: 2021-04-08
Payer: MEDICAID

## 2021-04-08 VITALS
RESPIRATION RATE: 15 BRPM | BODY MASS INDEX: 32.28 KG/M2 | HEART RATE: 69 BPM | SYSTOLIC BLOOD PRESSURE: 148 MMHG | WEIGHT: 225 LBS | OXYGEN SATURATION: 98 % | DIASTOLIC BLOOD PRESSURE: 83 MMHG

## 2021-04-08 DIAGNOSIS — E78.5 HYPERLIPIDEMIA, UNSPECIFIED HYPERLIPIDEMIA TYPE: ICD-10-CM

## 2021-04-08 DIAGNOSIS — K76.9 LIVER LESION: ICD-10-CM

## 2021-04-08 DIAGNOSIS — R07.9 CHEST PAIN, UNSPECIFIED TYPE: ICD-10-CM

## 2021-04-08 DIAGNOSIS — R20.0 NUMBNESS OF LEFT LOWER EXTREMITY: ICD-10-CM

## 2021-04-08 DIAGNOSIS — Z79.4 TYPE 2 DIABETES MELLITUS WITH DIABETIC NEUROPATHY, WITH LONG-TERM CURRENT USE OF INSULIN (HCC): Primary | ICD-10-CM

## 2021-04-08 DIAGNOSIS — E11.40 TYPE 2 DIABETES MELLITUS WITH DIABETIC NEUROPATHY, WITH LONG-TERM CURRENT USE OF INSULIN (HCC): Primary | ICD-10-CM

## 2021-04-08 DIAGNOSIS — I10 ESSENTIAL HYPERTENSION: ICD-10-CM

## 2021-04-08 LAB — HBA1C MFR BLD: 6.7 %

## 2021-04-08 PROCEDURE — 99214 OFFICE O/P EST MOD 30 MIN: CPT | Performed by: INTERNAL MEDICINE

## 2021-04-08 PROCEDURE — 1036F TOBACCO NON-USER: CPT | Performed by: INTERNAL MEDICINE

## 2021-04-08 PROCEDURE — 83036 HEMOGLOBIN GLYCOSYLATED A1C: CPT | Performed by: INTERNAL MEDICINE

## 2021-04-08 PROCEDURE — 2022F DILAT RTA XM EVC RTNOPTHY: CPT | Performed by: INTERNAL MEDICINE

## 2021-04-08 PROCEDURE — G8427 DOCREV CUR MEDS BY ELIG CLIN: HCPCS | Performed by: INTERNAL MEDICINE

## 2021-04-08 PROCEDURE — 3044F HG A1C LEVEL LT 7.0%: CPT | Performed by: INTERNAL MEDICINE

## 2021-04-08 PROCEDURE — G8417 CALC BMI ABV UP PARAM F/U: HCPCS | Performed by: INTERNAL MEDICINE

## 2021-04-08 RX ORDER — INSULIN LISPRO 100 [IU]/ML
INJECTION, SOLUTION INTRAVENOUS; SUBCUTANEOUS
Qty: 15 ML | Refills: 5 | Status: SHIPPED | OUTPATIENT
Start: 2021-04-08 | End: 2021-09-01 | Stop reason: SDUPTHER

## 2021-04-08 RX ORDER — ATORVASTATIN CALCIUM 40 MG/1
40 TABLET, FILM COATED ORAL DAILY
Qty: 30 TABLET | Refills: 5 | Status: SHIPPED | OUTPATIENT
Start: 2021-04-08 | End: 2021-09-01 | Stop reason: DRUGHIGH

## 2021-04-08 RX ORDER — BLOOD-GLUCOSE METER
1 EACH MISCELLANEOUS DAILY
Qty: 1 KIT | Refills: 0 | Status: SHIPPED | OUTPATIENT
Start: 2021-04-08 | End: 2022-03-17 | Stop reason: SDUPTHER

## 2021-04-08 RX ORDER — GLUCOSAMINE HCL/CHONDROITIN SU 500-400 MG
1 CAPSULE ORAL 4 TIMES DAILY
Qty: 120 EACH | Refills: 5 | Status: SHIPPED | OUTPATIENT
Start: 2021-04-08 | End: 2021-09-01 | Stop reason: SDUPTHER

## 2021-04-08 RX ORDER — BLOOD SUGAR DIAGNOSTIC
STRIP MISCELLANEOUS
Qty: 200 STRIP | Refills: 5 | Status: SHIPPED | OUTPATIENT
Start: 2021-04-08 | End: 2021-04-15 | Stop reason: SDUPTHER

## 2021-04-08 RX ORDER — LISINOPRIL 5 MG/1
5 TABLET ORAL DAILY
Qty: 30 TABLET | Refills: 5 | Status: SHIPPED | OUTPATIENT
Start: 2021-04-08 | End: 2021-09-01 | Stop reason: SDUPTHER

## 2021-04-08 RX ORDER — OMEPRAZOLE 40 MG/1
40 CAPSULE, DELAYED RELEASE ORAL DAILY
Qty: 30 CAPSULE | Refills: 5 | Status: SHIPPED | OUTPATIENT
Start: 2021-04-08 | End: 2021-09-01 | Stop reason: SDUPTHER

## 2021-04-08 RX ORDER — LANCETS 28 GAUGE
100 EACH MISCELLANEOUS 3 TIMES DAILY
Qty: 100 EACH | Refills: 6 | Status: SHIPPED | OUTPATIENT
Start: 2021-04-08 | End: 2021-09-01 | Stop reason: SDUPTHER

## 2021-04-08 ASSESSMENT — PATIENT HEALTH QUESTIONNAIRE - PHQ9
2. FEELING DOWN, DEPRESSED OR HOPELESS: 0
SUM OF ALL RESPONSES TO PHQ QUESTIONS 1-9: 0
SUM OF ALL RESPONSES TO PHQ9 QUESTIONS 1 & 2: 0
SUM OF ALL RESPONSES TO PHQ QUESTIONS 1-9: 0
1. LITTLE INTEREST OR PLEASURE IN DOING THINGS: 0

## 2021-04-08 ASSESSMENT — ENCOUNTER SYMPTOMS
NAUSEA: 0
DIARRHEA: 0
ABDOMINAL PAIN: 0
SHORTNESS OF BREATH: 0
VOMITING: 0
CONSTIPATION: 0
BLOOD IN STOOL: 0

## 2021-04-08 NOTE — PROGRESS NOTES
Karen Vega (:  1975) is a 41 y.o. male,Established patient, here for evaluation of the following chief complaint(s):  Follow-up and ED Follow-up      ASSESSMENT/PLAN:  1. Type 2 diabetes mellitus with diabetic neuropathy, with long-term current use of insulin ([address]  His glycemic control is excellent today. Continue working on diet. Continue current insulin regimen. Advised to schedule follow-up with his eye doctor. Discussed neuropathy and foot exams  -     POCT glycosylated hemoglobin (Hb A1C)  -     blood glucose test strips (ONETOUCH ULTRA) strip; TEST FOUR TIMES DAILY AS NEEDED, Disp-200 strip, R-5Normal  -     FreeStyle Lancets MISC; 3 TIMES DAILY Starting Thu 2021, Disp-100 each, R-6, Normal  2. Essential hypertension  Above goal today but he did not take his medication this morning. Recommend he take it regularly. Would not change his medications today as it looks good in the ER and at last visit. Close follow-up scheduled   3. Hyperlipidemia, unspecified hyperlipidemia type  Continue statin, labs at next visit  4. Liver lesion  Patient is concerned he will move forward with MRI abdomen  -     MRI ABDOMEN W WO CONTRAST; Future  5. Chest pain, unspecified type  reviewed ER record including CT PE, labs, ekgs. Exam today significant for reproducible cp. Continue nsaids. He would like to hold off on seeing cardiology but will notify me if symptoms persist or worsen. 6. Numbness of left lower extremity  Suspect this may be related to his diabetic neuropathy but given the severity of symptoms, we can move forward with a work-up. Discussed importance of checking his feet daily. -     EMG; Future  -     VL RICKY BILATERAL LIMITED 1-2 LEVELS; Future      Return in about 3 months (around 2021). SUBJECTIVE/OBJECTIVE:  HPI  Patient presents for follow-up of type 2 diabetes mellitus, hypertension, hyperlipidemia. His A1c was 7.7 at last visit in 2020. 6.8 today.    basaglar 30 u twice daily - had issues taking 60 units at once. No hypoglycemia with this change. He was also in the ER on April 5 with chief complaint of chest pain. He had a negative cardiac work-up including EKGs, troponin x2, CT angiogram.  He was discharged with cardiology follow-up. His CT PE protocol did note liver lesions of indeterminate significance. Liver protocol MRI is recommended. He states the chest pain feels like a muscle. He had been slamming a sledgehammer. Pressing around his sternum reproduces the pain. Prescribed toradol but hasnt picked up yet. ibuprofen not really helping. He lost his job and has been working more than usual.  He has more concerns regarding numbness in his left lower extremity. He has neuropathy in his feet but notices much worse symptoms on the left side. States it gets numb all the way up into his knee. This is very bothersome to him. Painful at times. No new back pain or injury. No weakness. FINDINGS:   Pulmonary Arteries: Pulmonary arteries are adequately opacified for   evaluation.  No evidence of intraluminal filling defect to suggest pulmonary   embolism.  Main pulmonary artery is normal in caliber.       Mediastinum: No evidence of mediastinal lymphadenopathy.  The heart and   pericardium demonstrate no acute abnormality.  There is no acute abnormality   of the thoracic aorta.       Lungs/pleura: The lungs are without acute process.  No focal consolidation or   pulmonary edema.  No evidence of pleural effusion or pneumothorax.       Upper Abdomen: Low to intermediate density lesion in the lateral segment of   the left hepatic lobe is unchanged.  There is also a poorly defined   low-density lesion in the lateral right hepatic lobe measuring 2.8 x 3.1 cm. An adjacent 1.2 cm low-density lesion is also seen.       Soft Tissues/Bones: No acute bone or soft tissue abnormality.           Impression   No evidence of pulmonary embolism or acute pulmonary abnormality.     Indeterminate liver lesions.  Liver protocol MRI is recommended for further   evaluation. Current Outpatient Medications   Medication Sig Dispense Refill    Blood Glucose Monitoring Suppl (ONE TOUCH ULTRA 2) w/Device KIT 1 kit by Does not apply route daily 1 kit 0    omeprazole (PRILOSEC) 40 MG delayed release capsule Take 1 capsule by mouth daily 30 capsule 5    lisinopril (PRINIVIL;ZESTRIL) 5 MG tablet Take 1 tablet by mouth daily 30 tablet 5    insulin lispro, 1 Unit Dial, 100 UNIT/ML SOPN 30 units into the skin three items daily before meals 15 mL 5    atorvastatin (LIPITOR) 40 MG tablet Take 1 tablet by mouth daily 30 tablet 5    Alcohol Swabs 70 % PADS Apply 1 Units topically 4 times daily 120 each 5    blood glucose test strips (ONETOUCH ULTRA) strip TEST FOUR TIMES DAILY AS NEEDED 200 strip 5    FreeStyle Lancets MISC 100 each by Does not apply route 3 times daily 100 each 6    ketorolac (TORADOL) 10 MG tablet Take 1 tablet by mouth every 6 hours as needed for Pain 20 tablet 0    BASAGLAR KWIKPEN 100 UNIT/ML injection pen ADMINISTER 50 UNITS UNDER THE SKIN EVERY DAY 15 mL 3    Insulin Pen Needle (BD PEN NEEDLE VIBHA U/F) 32G X 4 MM MISC USE AS DIRECTED FOUR TIMES DAILY 100 each 5    ibuprofen (ADVIL;MOTRIN) 600 MG tablet Take 1 tablet by mouth 3 times daily as needed for Pain 30 tablet 1    baclofen (LIORESAL) 10 MG tablet TAKE 1 TABLET BY MOUTH TWICE DAILY 60 tablet 5     No current facility-administered medications for this visit. Review of Systems   Respiratory: Negative for shortness of breath. Cardiovascular: Negative for chest pain, palpitations and leg swelling. Gastrointestinal: Negative for abdominal pain, blood in stool, constipation, diarrhea, nausea and vomiting. Neurological: Positive for numbness. Negative for dizziness and light-headedness. Physical Exam  Constitutional:       General: He is not in acute distress. Appearance: Normal appearance. HENT:      Head: Normocephalic and atraumatic. Cardiovascular:      Rate and Rhythm: Normal rate and regular rhythm. Comments: +ttp along left mid sternum costochondral junctions  Pulmonary:      Effort: Pulmonary effort is normal. No respiratory distress. Breath sounds: Normal breath sounds. No wheezing or rales. Musculoskeletal:      Right lower leg: No edema. Left lower leg: No edema. Neurological:      General: No focal deficit present. Mental Status: He is alert. Motor: No weakness. Psychiatric:         Mood and Affect: Mood normal.              An electronic signature was used to authenticate this note.     --Minor Candelaria MD

## 2021-04-15 ENCOUNTER — TELEPHONE (OUTPATIENT)
Dept: FAMILY MEDICINE CLINIC | Age: 46
End: 2021-04-15

## 2021-04-15 DIAGNOSIS — Z79.4 TYPE 2 DIABETES MELLITUS WITH DIABETIC NEUROPATHY, WITH LONG-TERM CURRENT USE OF INSULIN (HCC): ICD-10-CM

## 2021-04-15 DIAGNOSIS — E11.40 TYPE 2 DIABETES MELLITUS WITH DIABETIC NEUROPATHY, WITH LONG-TERM CURRENT USE OF INSULIN (HCC): ICD-10-CM

## 2021-04-15 RX ORDER — BIOTIN 1 MG
TABLET ORAL
COMMUNITY
End: 2021-04-15

## 2021-04-15 RX ORDER — BLOOD SUGAR DIAGNOSTIC
STRIP MISCELLANEOUS
Qty: 400 STRIP | Refills: 5 | Status: SHIPPED | OUTPATIENT
Start: 2021-04-15 | End: 2021-09-01 | Stop reason: SDUPTHER

## 2021-04-15 NOTE — TELEPHONE ENCOUNTER
Having difficulty getting test strips refilled. States he is testing 4 x a day. Last Rx he was only given 2 boxes of 50 each. Insurance is not wanting to refill. Needs a new script sent into [facility] [phone].  Pt is reachable at [phone] [address]  Past apt; 4/8  F/U: 7/9

## 2021-04-15 NOTE — TELEPHONE ENCOUNTER
Having difficulty getting test strips refilled. States he is testing 4 x a day. Last Rx he was only given 2 boxes of 50 each. Insurance is not wanting to refill. Needs a new script sent into [facility] [phone].  Pt is reachable at [phone]

## 2021-04-19 RX ORDER — INSULIN GLARGINE 100 [IU]/ML
INJECTION, SOLUTION SUBCUTANEOUS
Qty: 15 ML | Refills: 3 | Status: SHIPPED | OUTPATIENT
Start: 2021-04-19 | End: 2021-09-01 | Stop reason: SDUPTHER

## 2021-05-21 RX ORDER — IBUPROFEN 600 MG/1
600 TABLET ORAL 3 TIMES DAILY PRN
Qty: 30 TABLET | Refills: 1 | Status: SHIPPED | OUTPATIENT
Start: 2021-05-21 | End: 2021-07-12

## 2021-07-12 RX ORDER — IBUPROFEN 600 MG/1
TABLET ORAL
Qty: 30 TABLET | Refills: 1 | Status: SHIPPED | OUTPATIENT
Start: 2021-07-12 | End: 2021-10-30

## 2021-09-01 ENCOUNTER — OFFICE VISIT (OUTPATIENT)
Dept: FAMILY MEDICINE CLINIC | Age: 46
End: 2021-09-01
Payer: MEDICAID

## 2021-09-01 VITALS
OXYGEN SATURATION: 97 % | BODY MASS INDEX: 34.16 KG/M2 | SYSTOLIC BLOOD PRESSURE: 152 MMHG | HEART RATE: 67 BPM | WEIGHT: 244 LBS | TEMPERATURE: 97.2 F | DIASTOLIC BLOOD PRESSURE: 83 MMHG | HEIGHT: 71 IN

## 2021-09-01 DIAGNOSIS — E11.40 TYPE 2 DIABETES MELLITUS WITH DIABETIC NEUROPATHY, WITH LONG-TERM CURRENT USE OF INSULIN (HCC): Primary | ICD-10-CM

## 2021-09-01 DIAGNOSIS — I10 ESSENTIAL HYPERTENSION: ICD-10-CM

## 2021-09-01 DIAGNOSIS — Z79.4 TYPE 2 DIABETES MELLITUS WITH DIABETIC NEUROPATHY, WITH LONG-TERM CURRENT USE OF INSULIN (HCC): Primary | ICD-10-CM

## 2021-09-01 DIAGNOSIS — E78.5 HYPERLIPIDEMIA, UNSPECIFIED HYPERLIPIDEMIA TYPE: ICD-10-CM

## 2021-09-01 LAB — HBA1C MFR BLD: 7.7 %

## 2021-09-01 PROCEDURE — 1036F TOBACCO NON-USER: CPT | Performed by: INTERNAL MEDICINE

## 2021-09-01 PROCEDURE — 99214 OFFICE O/P EST MOD 30 MIN: CPT | Performed by: INTERNAL MEDICINE

## 2021-09-01 PROCEDURE — 2022F DILAT RTA XM EVC RTNOPTHY: CPT | Performed by: INTERNAL MEDICINE

## 2021-09-01 PROCEDURE — 83036 HEMOGLOBIN GLYCOSYLATED A1C: CPT | Performed by: INTERNAL MEDICINE

## 2021-09-01 PROCEDURE — G8417 CALC BMI ABV UP PARAM F/U: HCPCS | Performed by: INTERNAL MEDICINE

## 2021-09-01 PROCEDURE — 3051F HG A1C>EQUAL 7.0%<8.0%: CPT | Performed by: INTERNAL MEDICINE

## 2021-09-01 PROCEDURE — G8427 DOCREV CUR MEDS BY ELIG CLIN: HCPCS | Performed by: INTERNAL MEDICINE

## 2021-09-01 RX ORDER — INSULIN LISPRO 100 [IU]/ML
INJECTION, SOLUTION INTRAVENOUS; SUBCUTANEOUS
Qty: 15 ML | Refills: 5 | Status: SHIPPED | OUTPATIENT
Start: 2021-09-01 | End: 2022-02-14 | Stop reason: SDUPTHER

## 2021-09-01 RX ORDER — GLUCOSAMINE HCL/CHONDROITIN SU 500-400 MG
1 CAPSULE ORAL 4 TIMES DAILY
Qty: 120 EACH | Refills: 5 | Status: SHIPPED | OUTPATIENT
Start: 2021-09-01 | End: 2022-02-14 | Stop reason: SDUPTHER

## 2021-09-01 RX ORDER — BLOOD SUGAR DIAGNOSTIC
STRIP MISCELLANEOUS
Qty: 400 STRIP | Refills: 5 | Status: SHIPPED | OUTPATIENT
Start: 2021-09-01 | End: 2022-03-17 | Stop reason: SDUPTHER

## 2021-09-01 RX ORDER — LISINOPRIL 5 MG/1
5 TABLET ORAL DAILY
Qty: 30 TABLET | Refills: 5 | Status: SHIPPED | OUTPATIENT
Start: 2021-09-01 | End: 2022-02-14 | Stop reason: SDUPTHER

## 2021-09-01 RX ORDER — ATORVASTATIN CALCIUM 20 MG/1
20 TABLET, FILM COATED ORAL DAILY
Qty: 90 TABLET | Refills: 0 | Status: SHIPPED | OUTPATIENT
Start: 2021-09-01 | End: 2022-02-14 | Stop reason: SDUPTHER

## 2021-09-01 RX ORDER — PEN NEEDLE, DIABETIC 32GX 5/32"
NEEDLE, DISPOSABLE MISCELLANEOUS
Qty: 100 EACH | Refills: 5 | Status: SHIPPED | OUTPATIENT
Start: 2021-09-01 | End: 2022-02-14 | Stop reason: SDUPTHER

## 2021-09-01 RX ORDER — LANCETS 28 GAUGE
100 EACH MISCELLANEOUS 3 TIMES DAILY
Qty: 100 EACH | Refills: 6 | Status: SHIPPED | OUTPATIENT
Start: 2021-09-01 | End: 2022-03-17 | Stop reason: SDUPTHER

## 2021-09-01 RX ORDER — OMEPRAZOLE 40 MG/1
40 CAPSULE, DELAYED RELEASE ORAL DAILY
Qty: 30 CAPSULE | Refills: 5 | Status: SHIPPED | OUTPATIENT
Start: 2021-09-01 | End: 2022-03-17 | Stop reason: SDUPTHER

## 2021-09-01 RX ORDER — INSULIN GLARGINE 100 [IU]/ML
INJECTION, SOLUTION SUBCUTANEOUS
Qty: 15 ML | Refills: 5 | Status: SHIPPED | OUTPATIENT
Start: 2021-09-01 | End: 2022-02-14 | Stop reason: SDUPTHER

## 2021-09-01 ASSESSMENT — ENCOUNTER SYMPTOMS
CONSTIPATION: 0
ABDOMINAL PAIN: 0
VOMITING: 0
DIARRHEA: 0
SHORTNESS OF BREATH: 0
NAUSEA: 0
BLOOD IN STOOL: 0

## 2021-09-01 NOTE — PROGRESS NOTES
hypertension. However, he did not take it this morning. He does monitor at home    He feels well and has no new concerns or complaints about his health today    Current Outpatient Medications   Medication Sig Dispense Refill    atorvastatin (LIPITOR) 20 MG tablet Take 1 tablet by mouth daily 90 tablet 0    Dulaglutide 0.75 MG/0.5ML SOPN Inject 0.75 mg into the skin once a week 4 pen 5    insulin glargine (BASAGLAR KWIKPEN) 100 UNIT/ML injection pen ADMINISTER 50 UNITS UNDER THE SKIN EVERY DAY 15 mL 5    blood glucose test strips (ONETOUCH ULTRA) strip TEST FOUR TIMES DAILY 400 strip 5    omeprazole (PRILOSEC) 40 MG delayed release capsule Take 1 capsule by mouth daily 30 capsule 5    lisinopril (PRINIVIL;ZESTRIL) 5 MG tablet Take 1 tablet by mouth daily 30 tablet 5    insulin lispro, 1 Unit Dial, 100 UNIT/ML SOPN 30 units into the skin three items daily before meals 15 mL 5    Alcohol Swabs 70 % PADS Apply 1 Units topically 4 times daily 120 each 5    FreeStyle Lancets MISC 100 each by Does not apply route 3 times daily 100 each 6    Insulin Pen Needle (BD PEN NEEDLE VIBHA U/F) 32G X 4 MM MISC USE AS DIRECTED FOUR TIMES DAILY 100 each 5    ibuprofen (ADVIL;MOTRIN) 600 MG tablet TAKE 1 TABLET BY MOUTH THREE TIMES DAILY AS NEEDED FOR PAIN 30 tablet 1    Blood Glucose Monitoring Suppl (ONE TOUCH ULTRA 2) w/Device KIT 1 kit by Does not apply route daily 1 kit 0    ketorolac (TORADOL) 10 MG tablet Take 1 tablet by mouth every 6 hours as needed for Pain 20 tablet 0    baclofen (LIORESAL) 10 MG tablet TAKE 1 TABLET BY MOUTH TWICE DAILY 60 tablet 5     No current facility-administered medications for this visit. Review of Systems   Respiratory: Negative for shortness of breath. Cardiovascular: Negative for chest pain, palpitations and leg swelling. Gastrointestinal: Negative for abdominal pain, blood in stool, constipation, diarrhea, nausea and vomiting.    Neurological: Negative for dizziness and light-headedness. Physical Exam  Constitutional:       General: He is not in acute distress. Appearance: Normal appearance. HENT:      Head: Normocephalic and atraumatic. Cardiovascular:      Rate and Rhythm: Normal rate and regular rhythm. Pulmonary:      Effort: Pulmonary effort is normal. No respiratory distress. Breath sounds: Normal breath sounds. No wheezing or rales. Musculoskeletal:      Right lower leg: No edema. Left lower leg: No edema. Neurological:      General: No focal deficit present. Mental Status: He is alert. Psychiatric:         Mood and Affect: Mood normal.              An electronic signature was used to authenticate this note.     --Jaz Duggan MD

## 2021-09-03 NOTE — TELEPHONE ENCOUNTER
Insurance is not wanting to pay for trulicity script. Please advise.  Please call pt back at [phone]

## 2021-09-08 ENCOUNTER — TELEPHONE (OUTPATIENT)
Dept: ADMINISTRATIVE | Age: 46
End: 2021-09-08

## 2021-09-08 NOTE — TELEPHONE ENCOUNTER
Submitted PA for Trulicity 7.77LD/0.1JZ pen-injectors Key: [id number] - PA Case ID: [id number] - Rx #: [id number] Via [facility] Status APPROVED. Medication has been approved through 09/08/2022. See approval letter attached. Please notify patient. Thank you.

## 2021-10-30 ENCOUNTER — HOSPITAL ENCOUNTER (EMERGENCY)
Age: 46
Discharge: HOME | End: 2021-10-30
Payer: MEDICAID

## 2021-10-30 VITALS
OXYGEN SATURATION: 99 % | RESPIRATION RATE: 18 BRPM | SYSTOLIC BLOOD PRESSURE: 180 MMHG | HEART RATE: 82 BPM | HEIGHT: 70 IN | BODY MASS INDEX: 32.77 KG/M2 | WEIGHT: 228.9 LBS | DIASTOLIC BLOOD PRESSURE: 97 MMHG | TEMPERATURE: 97.8 F

## 2021-10-30 DIAGNOSIS — K02.9 DENTAL CARIES: ICD-10-CM

## 2021-10-30 DIAGNOSIS — K04.7 DENTAL ABSCESS: Primary | ICD-10-CM

## 2021-10-30 DIAGNOSIS — T85.848A DENTAL IMPLANT PAIN, INITIAL ENCOUNTER: ICD-10-CM

## 2021-10-30 DIAGNOSIS — R03.0 ELEVATED BLOOD PRESSURE READING: ICD-10-CM

## 2021-10-30 PROCEDURE — 99284 EMERGENCY DEPT VISIT MOD MDM: CPT

## 2021-10-30 PROCEDURE — 6370000000 HC RX 637 (ALT 250 FOR IP): Performed by: NURSE PRACTITIONER

## 2021-10-30 PROCEDURE — 41800 DRAINAGE OF GUM LESION: CPT

## 2021-10-30 RX ORDER — CHLORHEXIDINE GLUCONATE 0.12 MG/ML
15 RINSE ORAL 2 TIMES DAILY
Qty: 210 ML | Refills: 0 | Status: SHIPPED | OUTPATIENT
Start: 2021-10-30 | End: 2021-11-06

## 2021-10-30 RX ORDER — AMOXICILLIN 250 MG/1
500 CAPSULE ORAL ONCE
Status: COMPLETED | OUTPATIENT
Start: 2021-10-30 | End: 2021-10-30

## 2021-10-30 RX ORDER — AMOXICILLIN 500 MG/1
500 CAPSULE ORAL 2 TIMES DAILY
Qty: 14 CAPSULE | Refills: 0 | Status: SHIPPED | OUTPATIENT
Start: 2021-10-30 | End: 2021-11-06

## 2021-10-30 RX ORDER — IBUPROFEN 600 MG/1
600 TABLET ORAL 4 TIMES DAILY PRN
Qty: 30 TABLET | Refills: 0 | Status: SHIPPED | OUTPATIENT
Start: 2021-10-30 | End: 2022-02-14 | Stop reason: SDUPTHER

## 2021-10-30 RX ORDER — HYDROCODONE BITARTRATE AND ACETAMINOPHEN 5; 325 MG/1; MG/1
2 TABLET ORAL ONCE
Status: COMPLETED | OUTPATIENT
Start: 2021-10-30 | End: 2021-10-30

## 2021-10-30 RX ADMIN — HYDROCODONE BITARTRATE AND ACETAMINOPHEN 2 TABLET: 5; 325 TABLET ORAL at 21:19

## 2021-10-30 RX ADMIN — AMOXICILLIN 500 MG: 250 CAPSULE ORAL at 21:19

## 2021-10-30 ASSESSMENT — PAIN DESCRIPTION - LOCATION: LOCATION: TEETH

## 2021-10-30 ASSESSMENT — PAIN DESCRIPTION - PAIN TYPE: TYPE: ACUTE PAIN

## 2021-10-30 ASSESSMENT — PAIN SCALES - GENERAL
PAINLEVEL_OUTOF10: 10
PAINLEVEL_OUTOF10: 10

## 2021-10-30 ASSESSMENT — PAIN DESCRIPTION - ORIENTATION: ORIENTATION: LEFT

## 2021-10-30 NOTE — Clinical Note
Alka Guajardo was seen and treated in our emergency department on 10/30/2021. He may return to work on 11/02/2021. If you have any questions or concerns, please don't hesitate to call.       Oren Castro, NARESH - CNP

## 2021-10-31 NOTE — ED NOTES
Patient discharged  to home in stable condition via private car. Discharge instructions and prescriptions reviewed with patient. Patient verbalized understanding. Patient advised not to drive, drink ETOH or operate machinery while taking pain medications at home. Patient verbalized understanding. All belongings in tow including discharge paperwork.             Eleuterio Koch RN  10/30/21 7872

## 2021-10-31 NOTE — ED PROVIDER NOTES
[facility]        Pt Name: Brian Yang  MRN: [id number]  [place] 1975  Date of evaluation: 10/30/2021  Provider: NARESH Eugene - CNP  PCP: Sherryn Lombard, MD  Note Started: 9:04 PM EDT       LIZET. I have evaluated this patient. My supervising physician was available for consultation. CHIEF COMPLAINT       Chief Complaint   Patient presents with    Oral Swelling     left side tooth swelling/painful started 2 days ago. HISTORY OF PRESENT ILLNESS   (Location, Timing/Onset, Context/Setting, Quality, Duration, Modifying Factors, Severity, Associated Signs and Symptoms)  Note limiting factors. Chief Complaint: dental pain    Brian Yang is a 40 y.o. male with medical history of DM, HTN, HLD, depression, neuropathy who presents emergency department with complaints of pain and swelling to his left upper jaw that is been present for the past 2 to 3 days. Patient believes he possibly has an abscess as he has had 1 previously. He did attempt to call around with dental clinics to get an appointment and the soonest he can be seen is on November 10 with [facility].  He had tried taking ibuprofen and Tylenol at home without complete resolution of symptoms. He tried chewing soft foods and continue to have pain on the left side. He denies any associated fevers, rashes, angioedema, voice change, sinus congestion or swelling, tinnitus, headache, neck pain or stiffness, sore throat, coughing, wheezing, nausea, vomiting, diarrhea. He denies currently taking antibiotics for the symptoms. He denies any recent dental work. Denies any visit with the dentist in the past 6 months for routine cleaning. He denies any known injury or trauma that could have caused the abscess. Nursing Notes were all reviewed and agreed with or any disagreements were addressed in the HPI.     REVIEW OF SYSTEMS    (2-9 systems for level 4, 10 or more for level 5)     Review of Systems    Positives and Pertinent negatives as per HPI. Except as noted above in the ROS, all other systems were reviewed and negative.        PAST MEDICAL HISTORY     Past Medical History:   Diagnosis Date    Diabetes mellitus ([address]     Hyperlipidemia     Hypertension     Neuropathy     Psychiatric problem     depression         SURGICAL HISTORY     Past Surgical History:   Procedure Laterality Date    HIP SURGERY  01/2013    OTHER SURGICAL HISTORY Right 1/14/2013    excision of mass right hip         CURRENTMEDICATIONS       Discharge Medication List as of 10/30/2021  9:20 PM      CONTINUE these medications which have NOT CHANGED    Details   atorvastatin (LIPITOR) 20 MG tablet Take 1 tablet by mouth daily, Disp-90 tablet, R-0Normal      Dulaglutide 0.75 MG/0.5ML SOPN Inject 0.75 mg into the skin once a week, Disp-4 pen, R-5Normal      insulin glargine (BASAGLAR KWIKPEN) 100 UNIT/ML injection pen ADMINISTER 50 UNITS UNDER THE SKIN EVERY DAY, Disp-15 mL, R-5Normal      blood glucose test strips (ONETOUCH ULTRA) strip TEST FOUR TIMES DAILY, Disp-400 strip, R-5Normal      omeprazole (PRILOSEC) 40 MG delayed release capsule Take 1 capsule by mouth daily, Disp-30 capsule, R-5Normal      lisinopril (PRINIVIL;ZESTRIL) 5 MG tablet Take 1 tablet by mouth daily, Disp-30 tablet, R-5Normal      insulin lispro, 1 Unit Dial, 100 UNIT/ML SOPN 30 units into the skin three items daily before meals, Disp-15 mL, R-5Normal      Alcohol Swabs 70 % PADS 4 TIMES DAILY Starting Wed 9/1/2021, Disp-120 each, R-5, Normal      FreeStyle Lancets MISC 3 TIMES DAILY Starting Wed 9/1/2021, Disp-100 each, R-6, Normal      Insulin Pen Needle (BD PEN NEEDLE VIBHA U/F) 32G X 4 MM MISC Disp-100 each, R-5, NormalUSE AS DIRECTED FOUR TIMES DAILY      Blood Glucose Monitoring Suppl (ONE TOUCH ULTRA 2) w/Device KIT DAILY Starting Thu 4/8/2021, Disp-1 kit, R-0, Normal      baclofen (LIORESAL) 10 MG tablet TAKE 1 TABLET BY MOUTH TWICE DAILY, Disp-60 tablet, R-5Normal      ketorolac (TORADOL) 10 MG tablet Take 1 tablet by mouth every 6 hours as needed for Pain, Disp-20 tablet, R-0Normal               ALLERGIES     Dilaudid [hydromorphone]    FAMILYHISTORY       Family History   Problem Relation Age of Onset    Diabetes Maternal Grandmother           SOCIAL HISTORY       Social History     Tobacco Use    Smoking status: Former Smoker    Smokeless tobacco: Former User     Quit date: 1/8/2007   Vaping Use    Vaping Use: Never used   Substance Use Topics    Alcohol use: No     Comment: occasional    Drug use: Not Currently     Types: Marijuana     Comment: \"Smoke a little weed now and then for appetitie\". SCREENINGS             PHYSICAL EXAM    (up to 7 for level 4, 8 or more for level 5)     ED Triage Vitals [10/30/21 2102]   BP Temp Temp Source Pulse Resp SpO2 Height Weight   (!) 180/97 97.8 °F (36.6 °C) Infrared 82 18 99 % 5' 10\" (1.778 m) 228 lb 14.4 oz (103.8 kg)       Physical Exam  Vitals and nursing note reviewed. Constitutional:       General: He is awake. Appearance: Normal appearance. He is well-developed. He is morbidly obese. HENT:      Head: Normocephalic and atraumatic. Jaw: There is normal jaw occlusion. No swelling or malocclusion. Right Ear: Hearing and external ear normal.      Left Ear: Hearing and external ear normal.      Nose: No mucosal edema or rhinorrhea. Right Sinus: No maxillary sinus tenderness or frontal sinus tenderness. Left Sinus: Maxillary sinus tenderness present. No frontal sinus tenderness. Mouth/Throat:      Mouth: Mucous membranes are moist.      Dentition: Does not have dentures. Dental tenderness, gingival swelling and dental abscesses present. Pharynx: Oropharynx is clear. Comments: Along the buccal region of tooth 15 there is edema, fluctuance, and tenderness. No drainage is able to be expressed upon palpating.   No subungual edema, tender, or brawny. No edema of the hard or soft palate. No drooling, able to control secretions, or voice change. Eyes:      General:         Right eye: No discharge. Left eye: No discharge. Neck:      Trachea: Trachea and phonation normal.   Pulmonary:      Effort: Pulmonary effort is normal. No respiratory distress. Musculoskeletal:         General: Normal range of motion. Cervical back: Full passive range of motion without pain and normal range of motion. Lymphadenopathy:      Cervical: No cervical adenopathy. Skin:     General: Skin is warm and dry. Coloration: Skin is not pale. Neurological:      Mental Status: He is alert and oriented to person, place, and time. Psychiatric:         Behavior: Behavior normal. Behavior is cooperative. DIAGNOSTIC RESULTS   LABS:    Labs Reviewed - No data to display    When ordered only abnormal lab results are displayed. All other labs were within normal range or not returned as of this dictation. EKG: When ordered, EKG's are interpreted by the Emergency Department Physician in the absence of a cardiologist.  Please see their note for interpretation of EKG. RADIOLOGY:   Non-plain film images such as CT, Ultrasound and MRI are read by the radiologist. Plain radiographic images are visualized and preliminarily interpreted by the ED Provider with the below findings:        Interpretation per the Radiologist below, if available at the time of this note:    No orders to display     No results found.         PROCEDURES   Unless otherwise noted below, none     Procedures    CRITICAL CARE TIME   N/A    CONSULTS:  None      EMERGENCY DEPARTMENT COURSE and DIFFERENTIAL DIAGNOSIS/MDM:   Vitals:    Vitals:    10/30/21 2102   BP: (!) 180/97   Pulse: 82   Resp: 18   Temp: 97.8 °F (36.6 °C)   TempSrc: Infrared   SpO2: 99%   Weight: 228 lb 14.4 oz (103.8 kg)   Height: 5' 10\" (1.778 m)       Patient was given the following medications:  Medications   HYDROcodone-acetaminophen (NORCO) 5-325 MG per tablet 2 tablet (2 tablets Oral Given 10/30/21 2119)   amoxicillin (AMOXIL) capsule 500 mg (500 mg Oral Given 10/30/21 2119)           Care of this patient took place during the COVID-19 pandemic emergency. ED COURSE & MEDICAL DECISION MAKING    - The patient presented to the ER with complaints of dental pain. Vital signs were reviewed. Exam well-developed, well-nourished male who appears uncomfortable. Peripheral IV placed. Labs, Imaging ordered. - Pertinent Labs & Imaging studies reviewed. (See chart for details)   -  Patient seen and evaluated in the emergency department. -  Triage and nursing notes reviewed and incorporated. -  Old chart records reviewed and incorporated. -  LIZET. I have evaluated this patient. My supervising physician was available for consultation.  -  Differential diagnosis includes:  Epiglottitis, dental abscess, gingivitis, Ludewig's angina, angioedema, epistaxis, avulsed tooth, dehydration, sepsis, versus COVID-19  -  Work-up included:  See above  -  ED treatment included:   Norco, Amoxil, ice  -  Results discussed with patient. Holley Tomlinson is a 43-year-old male with complaints of dental pain and swelling concerning for abscess to his left upper jaw that has been present for 2 to 3 days. He did feel some swelling and called [facility], however was not able to be seen until November 10 and therefore presented to the emergency department. He did take Tylenol and Motrin for pain relief, however he ran out of Tylenol. He is currently not taking any antibiotics. On exam he does have fluctuance, edema, and tender abrasion to the buccal region of tooth 15. Oropharynx is clear and open. No drooling or voice change. He was instructed on home treatment and care. Instructed to brush twice daily and floss daily. He was given a dental clinic list and given strict return discharge instructions.   Shared decision making is completed patient is stable for discharge at this time. FINAL IMPRESSION      1. Dental abscess    2. Dental implant pain, initial encounter    3. Dental caries    4.  Elevated blood pressure reading          DISPOSITION/PLAN   DISPOSITION Decision To Discharge 10/30/2021 09:16:18 PM      PATIENT REFERRED TO:  Marcella Randle MD  [address] [place] [address]  [phone]    Call in 2 days  As needed, If symptoms worsen    [facility] Emergency Department  [facility]  [phone]  Go to   As needed      DISCHARGE MEDICATIONS:  Discharge Medication List as of 10/30/2021  9:20 PM      START taking these medications    Details   amoxicillin (AMOXIL) 500 MG capsule Take 1 capsule by mouth 2 times daily for 7 days, Disp-14 capsule, R-0Print      chlorhexidine (PERIDEX) 0.12 % solution Take 15 mLs by mouth 2 times daily for 7 days, Disp-210 mL, R-0Print             DISCONTINUED MEDICATIONS:  Discharge Medication List as of 10/30/2021  9:20 PM      STOP taking these medications       indomethacin (INDOCIN) 50 MG capsule Comments:   Reason for Stopping:                      (Please note that portions of this note were completed with a voice recognition program.  Efforts were made to edit the dictations but occasionally words are mis-transcribed.)    NARESH Mcconnell CNP (electronically signed)            NARESH Mcconnell CNP  10/30/21 3211

## 2021-11-30 ENCOUNTER — APPOINTMENT (OUTPATIENT)
Dept: CT IMAGING | Age: 46
End: 2021-11-30
Payer: MEDICAID

## 2021-11-30 ENCOUNTER — APPOINTMENT (OUTPATIENT)
Dept: GENERAL RADIOLOGY | Age: 46
End: 2021-11-30
Payer: MEDICAID

## 2021-11-30 ENCOUNTER — TELEPHONE (OUTPATIENT)
Dept: FAMILY MEDICINE CLINIC | Age: 46
End: 2021-11-30

## 2021-11-30 ENCOUNTER — HOSPITAL ENCOUNTER (OUTPATIENT)
Age: 46
Setting detail: OBSERVATION
Discharge: HOME | End: 2021-12-01
Attending: EMERGENCY MEDICINE | Admitting: STUDENT IN AN ORGANIZED HEALTH CARE EDUCATION/TRAINING PROGRAM
Payer: MEDICAID

## 2021-11-30 DIAGNOSIS — R42 VERTIGO: ICD-10-CM

## 2021-11-30 DIAGNOSIS — R42 DIZZINESS: Primary | ICD-10-CM

## 2021-11-30 LAB
ANION GAP SERPL CALCULATED.3IONS-SCNC: 13 MMOL/L (ref 3–16)
BASOPHILS ABSOLUTE: 0.1 K/UL (ref 0–0.2)
BASOPHILS RELATIVE PERCENT: 0.5 %
BILIRUBIN URINE: ABNORMAL
BLOOD, URINE: NEGATIVE
BUN BLDV-MCNC: 9 MG/DL (ref 7–20)
CALCIUM SERPL-MCNC: 8.3 MG/DL (ref 8.3–10.6)
CHLORIDE BLD-SCNC: 104 MMOL/L (ref 99–110)
CLARITY: CLEAR
CO2: 20 MMOL/L (ref 21–32)
COLOR: YELLOW
CREAT SERPL-MCNC: 0.9 MG/DL (ref 0.9–1.3)
D DIMER: 214 NG/ML DDU (ref 0–229)
EOSINOPHILS ABSOLUTE: 0.1 K/UL (ref 0–0.6)
EOSINOPHILS RELATIVE PERCENT: 0.6 %
GFR AFRICAN AMERICAN: >60
GFR NON-AFRICAN AMERICAN: >60
GLUCOSE BLD-MCNC: 249 MG/DL (ref 70–99)
GLUCOSE BLD-MCNC: 259 MG/DL (ref 70–99)
GLUCOSE URINE: 250 MG/DL
HCT VFR BLD CALC: 46.3 % (ref 40.5–52.5)
HEMOGLOBIN: 14.6 G/DL (ref 13.5–17.5)
KETONES, URINE: ABNORMAL MG/DL
LEUKOCYTE ESTERASE, URINE: NEGATIVE
LYMPHOCYTES ABSOLUTE: 2.9 K/UL (ref 1–5.1)
LYMPHOCYTES RELATIVE PERCENT: 25.1 %
MCH RBC QN AUTO: 32.4 PG (ref 26–34)
MCHC RBC AUTO-ENTMCNC: 31.6 G/DL (ref 31–36)
MCV RBC AUTO: 102.3 FL (ref 80–100)
MICROSCOPIC EXAMINATION: ABNORMAL
MONOCYTES ABSOLUTE: 0.8 K/UL (ref 0–1.3)
MONOCYTES RELATIVE PERCENT: 6.7 %
NEUTROPHILS ABSOLUTE: 7.6 K/UL (ref 1.7–7.7)
NEUTROPHILS RELATIVE PERCENT: 67.1 %
NITRITE, URINE: NEGATIVE
PDW BLD-RTO: 13.4 % (ref 12.4–15.4)
PERFORMED ON: ABNORMAL
PH UA: 5.5 (ref 5–8)
PLATELET # BLD: 180 K/UL (ref 135–450)
PMV BLD AUTO: 10.1 FL (ref 5–10.5)
POTASSIUM SERPL-SCNC: 4.1 MMOL/L (ref 3.5–5.1)
PROTEIN UA: NEGATIVE MG/DL
RBC # BLD: 4.53 M/UL (ref 4.2–5.9)
SARS-COV-2, NAAT: NOT DETECTED
SODIUM BLD-SCNC: 137 MMOL/L (ref 136–145)
SPECIFIC GRAVITY UA: >1.03 (ref 1–1.03)
TROPONIN: <0.01 NG/ML
URINE TYPE: ABNORMAL
UROBILINOGEN, URINE: 1 E.U./DL
WBC # BLD: 11.4 K/UL (ref 4–11)

## 2021-11-30 PROCEDURE — 87635 SARS-COV-2 COVID-19 AMP PRB: CPT

## 2021-11-30 PROCEDURE — 81003 URINALYSIS AUTO W/O SCOPE: CPT

## 2021-11-30 PROCEDURE — 84484 ASSAY OF TROPONIN QUANT: CPT

## 2021-11-30 PROCEDURE — 70498 CT ANGIOGRAPHY NECK: CPT

## 2021-11-30 PROCEDURE — 99283 EMERGENCY DEPT VISIT LOW MDM: CPT

## 2021-11-30 PROCEDURE — 6370000000 HC RX 637 (ALT 250 FOR IP): Performed by: EMERGENCY MEDICINE

## 2021-11-30 PROCEDURE — 85025 COMPLETE CBC W/AUTO DIFF WBC: CPT

## 2021-11-30 PROCEDURE — 93005 ELECTROCARDIOGRAM TRACING: CPT | Performed by: NURSE PRACTITIONER

## 2021-11-30 PROCEDURE — 80048 BASIC METABOLIC PNL TOTAL CA: CPT

## 2021-11-30 PROCEDURE — 36415 COLL VENOUS BLD VENIPUNCTURE: CPT

## 2021-11-30 PROCEDURE — 70450 CT HEAD/BRAIN W/O DYE: CPT

## 2021-11-30 PROCEDURE — 71045 X-RAY EXAM CHEST 1 VIEW: CPT

## 2021-11-30 PROCEDURE — G0378 HOSPITAL OBSERVATION PER HR: HCPCS

## 2021-11-30 PROCEDURE — 85379 FIBRIN DEGRADATION QUANT: CPT

## 2021-11-30 PROCEDURE — 6360000004 HC RX CONTRAST MEDICATION: Performed by: NURSE PRACTITIONER

## 2021-11-30 RX ORDER — ASPIRIN 300 MG/1
300 SUPPOSITORY RECTAL DAILY
Status: DISCONTINUED | OUTPATIENT
Start: 2021-12-01 | End: 2021-12-01 | Stop reason: HOSPADM

## 2021-11-30 RX ORDER — DEXTROSE MONOHYDRATE 50 MG/ML
100 INJECTION, SOLUTION INTRAVENOUS PRN
Status: DISCONTINUED | OUTPATIENT
Start: 2021-11-30 | End: 2021-12-01 | Stop reason: HOSPADM

## 2021-11-30 RX ORDER — ASPIRIN 81 MG/1
81 TABLET, CHEWABLE ORAL ONCE
Status: COMPLETED | OUTPATIENT
Start: 2021-11-30 | End: 2021-11-30

## 2021-11-30 RX ORDER — ASPIRIN 81 MG/1
81 TABLET ORAL DAILY
Status: DISCONTINUED | OUTPATIENT
Start: 2021-12-01 | End: 2021-12-01 | Stop reason: HOSPADM

## 2021-11-30 RX ORDER — ONDANSETRON 2 MG/ML
4 INJECTION INTRAMUSCULAR; INTRAVENOUS EVERY 6 HOURS PRN
Status: DISCONTINUED | OUTPATIENT
Start: 2021-11-30 | End: 2021-12-01 | Stop reason: HOSPADM

## 2021-11-30 RX ORDER — ONDANSETRON 4 MG/1
4 TABLET, ORALLY DISINTEGRATING ORAL EVERY 8 HOURS PRN
Status: DISCONTINUED | OUTPATIENT
Start: 2021-11-30 | End: 2021-12-01 | Stop reason: HOSPADM

## 2021-11-30 RX ORDER — NICOTINE POLACRILEX 4 MG
15 LOZENGE BUCCAL PRN
Status: DISCONTINUED | OUTPATIENT
Start: 2021-11-30 | End: 2021-12-01 | Stop reason: HOSPADM

## 2021-11-30 RX ORDER — ATORVASTATIN CALCIUM 20 MG/1
20 TABLET, FILM COATED ORAL DAILY
Status: DISCONTINUED | OUTPATIENT
Start: 2021-12-01 | End: 2021-12-01 | Stop reason: HOSPADM

## 2021-11-30 RX ORDER — DEXTROSE MONOHYDRATE 25 G/50ML
12.5 INJECTION, SOLUTION INTRAVENOUS PRN
Status: DISCONTINUED | OUTPATIENT
Start: 2021-11-30 | End: 2021-12-01 | Stop reason: HOSPADM

## 2021-11-30 RX ORDER — INSULIN GLARGINE 100 [IU]/ML
30 INJECTION, SOLUTION SUBCUTANEOUS NIGHTLY
Status: DISCONTINUED | OUTPATIENT
Start: 2021-12-01 | End: 2021-12-01 | Stop reason: HOSPADM

## 2021-11-30 RX ADMIN — ASPIRIN 81 MG: 81 TABLET, CHEWABLE ORAL at 20:56

## 2021-11-30 RX ADMIN — IOPAMIDOL 75 ML: 755 INJECTION, SOLUTION INTRAVENOUS at 18:44

## 2021-11-30 ASSESSMENT — ENCOUNTER SYMPTOMS
BACK PAIN: 0
VOMITING: 1
EYE PAIN: 0
PHOTOPHOBIA: 0
SORE THROAT: 0
SHORTNESS OF BREATH: 0
ANAL BLEEDING: 0
ABDOMINAL PAIN: 0
COUGH: 0
NAUSEA: 1

## 2021-11-30 ASSESSMENT — PAIN SCALES - WONG BAKER: WONGBAKER_NUMERICALRESPONSE: 6

## 2021-11-30 ASSESSMENT — PAIN DESCRIPTION - LOCATION
LOCATION: CHEST
LOCATION: CHEST

## 2021-11-30 ASSESSMENT — PAIN SCALES - GENERAL
PAINLEVEL_OUTOF10: 7
PAINLEVEL_OUTOF10: 7

## 2021-11-30 ASSESSMENT — PAIN DESCRIPTION - DESCRIPTORS
DESCRIPTORS: SHARP
DESCRIPTORS: SHARP

## 2021-11-30 ASSESSMENT — PAIN DESCRIPTION - FREQUENCY: FREQUENCY: INTERMITTENT

## 2021-11-30 ASSESSMENT — PAIN DESCRIPTION - PAIN TYPE: TYPE: ACUTE PAIN

## 2021-11-30 NOTE — TELEPHONE ENCOUNTER
----- Message from Shayy Egan sent at 11/30/2021 12:41 PM EST -----  Subject: Message to Provider    QUESTIONS  Information for Provider? Patient is having cold symptoms and isn't able   to be seen until 1/13/2022, rescheduled from 12/2/2021 due to his cold. Patient is requesting cold or flu medication be called in for treatment in   the meantime.  ---------------------------------------------------------------------------  --------------  CALL BACK INFO  What is the best way for the office to contact you? OK to leave message on   voicemail  Preferred Call Back Phone Number? [phone]  ---------------------------------------------------------------------------  --------------  SCRIPT ANSWERS  Relationship to Patient?  Self

## 2021-11-30 NOTE — Clinical Note
Patient Class: Observation [104]   REQUIRED: Diagnosis: Dizziness [351575]   Estimated Length of Stay: Estimated stay of less than 2 midnights   Admitting Provider: Srinivas Johnston [[id number]]   Telemetry/Cardiac Monitoring Required?: Yes

## 2021-11-30 NOTE — TELEPHONE ENCOUNTER
Please see what symptoms he is having, how long they have been going on, and if he has been Covid tested.

## 2021-11-30 NOTE — ED PROVIDER NOTES
I independently examined and evaluated Parris Paul. In brief, patient is a 27-year-old male presents the emergency department for evaluation of dizziness. Patient reports he was sitting down in his car waiting for his wife to be evaluated in the emergency department when he acutely had room spinning sensation followed by vomiting. Says he broke out in a sweat. Denies having anything like this in the past.  Focused exam revealed bidirectional nystagmus concerning for central vertigo. NIH 0 otherwise. DDx includes central versus peripheral vertigo, ACS, covid 19, among others. D-dimer is within normal meds at 214. Therefore patient at low risk for PE at this time and will not obtain CT PE. As he reports his wife was having body aches and was coming to the emergency department to be evaluated, will obtain Covid swab. Will obtain CT head without, CTA head and neck. glc 129. covid 19 negative. troponin negative. CT shows no acute intracranial bleed. CTA pending. Recommended admission for further evaluation and treatment. Patient agreeable with plan. Hospitalist consulted for admission. Admit. I provided at least 30 minutes of critical care excluding separately billable procedures. All diagnostic, treatment, and disposition decisions were made by myself in conjunction with the advanced practice provider. For all further details of the patient's emergency department visit, please see the advanced practice provider's documentation. Comment: Please note this report has been produced using speech recognition software and may contain errors related to that system including errors in grammar, punctuation, and spelling, as well as words and phrases that may be inappropriate. If there are any questions or concerns please feel free to contact the dictating provider for clarification.         Andreea Garrison MD  12/01/21 1257

## 2021-11-30 NOTE — ED PROVIDER NOTES
[address]  [address] [address]  Dept: [phone]  Loc: [phone]    EMERGENCY DEPARTMENT ENCOUNTER        Seen and evaluated by supervising physician, Dr. Karon Zarate. CHIEF COMPLAINT    Chief Complaint   Patient presents with    Dizziness     was waiting for spouse who was being treated and became very dizzy. Is diabetic       HPI    Meet Daley is a 40 y.o. male who presents with dizziness. Onset was 10 minutes prior to setting in. The duration has been constant since the onset. The quality of the dizziness is described as a room spinning sensation, the symptoms are aggravated by changing head position. The symptoms are alleviated by remaining still. Patient has associated pleuritic chest pain, nausea and vomiting x1. The patient's pain scale is \"sharp\" 6/10. She endorses that he smoked marijuana approximately 2 hours PTA. Symptoms onset as he was sitting still in his car with accompanying nausea and vomiting x1, presyncope, diaphoresis. Of note, the patient has previously been on a loop recorder which has been out for approximately 9-10 years. Last stress/echo was done 9/10 years ago as well. REVIEW OF SYSTEMS    Cardiac: + Pleuritic chest pain, no palpitations  Respiratory: No shortness of breath, no hemoptysis  General: No fevers   GI: No abdominal pain or diarrhea  Neuro: see HPI, No headache, no double vision  All other systems reviewed and are negative. Review of Systems   Constitutional: Positive for diaphoresis. Negative for chills, fatigue and fever. HENT: Negative for congestion and sore throat. Eyes: Negative for photophobia, pain and visual disturbance.        + Bidirectional nystagmus   Respiratory: Negative for cough and shortness of breath. Cardiovascular: Positive for chest pain (+ Pleuritic chest discomfort). Negative for leg swelling.    Gastrointestinal: Positive for nausea and vomiting (X1). Negative for abdominal pain and anal bleeding. Genitourinary: Negative for difficulty urinating, dysuria, frequency and urgency. Musculoskeletal: Negative for back pain and neck pain. Skin: Negative for rash and wound. Neurological: Positive for dizziness. Negative for syncope (+ Presyncope.), light-headedness and numbness. Hematological: Negative. Psychiatric/Behavioral: Negative. NIH Stroke Scale  NIH Stroke Scale Assessed: Yes  Interval: Baseline  Level of Consciousness (1a. ): Alert  LOC Questions (1b. ):  Answers both correctly  LOC Commands (1c. ): Performs both tasks correctly  Best Gaze (2. ): Normal  Visual (3. ): No visual loss  Facial Palsy (4. ): Normal symmetrical movement  Motor Arm, Left (5a. ): No drift  Motor Arm, Right (5b. ): No drift  Motor Leg, Left (6a. ): No drift  Motor Leg, Right (6b. ): No drift  Limb Ataxia (7. ): Absent  Sensory (8. ): Normal  Best Language (9. ): No aphasia  Dysarthria (10. ): Normal  Extinction and Inattention (11): No abnormality  Total: 0     No truncal instability  No difficulty performing finger-nose  No limb ataxia  Negative test of skew  Patient is here in the emergency department    PAST MEDICAL OR SURGICAL HISTORY    Past Medical History:   Diagnosis Date    Diabetes mellitus ([address]     Hyperlipidemia     Hypertension     Neuropathy     Psychiatric problem     depression     Past Surgical History:   Procedure Laterality Date    HIP SURGERY  01/2013    OTHER SURGICAL HISTORY Right 1/14/2013    excision of mass right hip       CURRENT MEDICATIONS  (may include discharge medications prescribed in the ED)  Current Outpatient Rx   Medication Sig Dispense Refill    ibuprofen (ADVIL;MOTRIN) 600 MG tablet Take 1 tablet by mouth 4 times daily as needed for Pain 30 tablet 0    atorvastatin (LIPITOR) 20 MG tablet Take 1 tablet by mouth daily 90 tablet 0    Dulaglutide 0.75 MG/0.5ML SOPN Inject 0.75 mg into the skin once a week 4 pen 5    insulin glargine (BASAGLAR KWIKPEN) 100 UNIT/ML injection pen ADMINISTER 50 UNITS UNDER THE SKIN EVERY DAY 15 mL 5    blood glucose test strips (ONETOUCH ULTRA) strip TEST FOUR TIMES DAILY 400 strip 5    omeprazole (PRILOSEC) 40 MG delayed release capsule Take 1 capsule by mouth daily 30 capsule 5    lisinopril (PRINIVIL;ZESTRIL) 5 MG tablet Take 1 tablet by mouth daily 30 tablet 5    insulin lispro, 1 Unit Dial, 100 UNIT/ML SOPN 30 units into the skin three items daily before meals 15 mL 5    Alcohol Swabs 70 % PADS Apply 1 Units topically 4 times daily 120 each 5    FreeStyle Lancets MISC 100 each by Does not apply route 3 times daily 100 each 6    Insulin Pen Needle (BD PEN NEEDLE VIBHA U/F) 32G X 4 MM MISC USE AS DIRECTED FOUR TIMES DAILY 100 each 5    Blood Glucose Monitoring Suppl (ONE TOUCH ULTRA 2) w/Device KIT 1 kit by Does not apply route daily 1 kit 0    ketorolac (TORADOL) 10 MG tablet Take 1 tablet by mouth every 6 hours as needed for Pain 20 tablet 0    baclofen (LIORESAL) 10 MG tablet TAKE 1 TABLET BY MOUTH TWICE DAILY 60 tablet 5       ALLERGIES    Allergies   Allergen Reactions    Dilaudid [Hydromorphone] Itching       SOCIAL HISTORY and FAMILY HISTORY  Social History     Socioeconomic History    Marital status: Single     Spouse name: Not on file    Number of children: Not on file    Years of education: Not on file    Highest education level: Not on file   Occupational History    Not on file   Tobacco Use    Smoking status: Former Smoker    Smokeless tobacco: Former User     Quit date: 1/8/2007   Vaping Use    Vaping Use: Never used   Substance and Sexual Activity    Alcohol use: No     Comment: occasional    Drug use: Not Currently     Types: Marijuana Moe Osman     Comment: \"Smoke a little weed now and then for appetitie\".     Sexual activity: Yes     Partners: Female   Other Topics Concern    Not on file   Social History Narrative    Not on file     Social covered/uncovered  Throat/Face:  nonerythematous throat, no trismus  Ears: canals appear patent and nonerythematous, TMs appear gray and nonbulging, mastoid and pinna are without redness or swelling, no mastoid tenderness   NECK: Normal range of motion, no tenderness  Respiratory:  No respiratory distress, normal breath sounds, no wheezing   Cardiovascular:  Regular rate, normal rhythm, no murmurs   GI:  Soft, nontender, nondistended, normal bowel sounds  Musculoskeletal:  No edema, no acute deformities   Integument:  Skin is warm and dry, no obvious rash    Vascular: + Radial and DP pulses 2+ and equal bilaterally  Neurologic:  Awake, alert, oriented to 4, no aphasia, no slurred speech, CN II-XII intact, normal finger to nose test bilaterally, 5/5 strength in all 4 extremities, no arm motor drift, no leg motor drift, sensation to light touch intact bilaterally, patellar reflexes 2+ and equal bilaterally, normal gait    EKG   Please see the physician's note for EKG interpretation. RADIOLOGY/PROCEDURES    No orders to display       ED COURSE & MEDICAL DECISION MAKING    Pertinent Labs & Imaging studies reviewed and interpreted. (See chart for details)    See chart for details of medications given during the ED stay. Vitals:    11/30/21 1544 11/30/21 1546   BP: 99/67    Pulse: 76    Resp: 16    Temp:  97.3 °F (36.3 °C)   TempSrc:  Oral   SpO2: 96%    Weight: 217 lb 9.5 oz (98.7 kg)    Height: 5' 10.5\" (1.791 m)        Differential diagnosis: Ménière's disease, benign positional vertigo, stroke or TIA in the posterior circulation, bleed of the cerebellopontine angle, cardiac arrhythmia, anemia, dehydration, sepsis, Metabolic emergency, Multiple Sclerosis, brain or ENT tumor, other    We will obtain CT head and CTA head and neck to evaluate for intracranial abnormalities/contraindications to MRI. Initially ordered D-dimer and after waiting a protracted duration of time ordered the CT PE study.  However, shortly thereafter the D-dimer was returned and negative for elevation. Canceled the CT PE as result. CT head and neck are pending. 188 0343: Shift report given to SORAYA gilbert who will receive CT study results and appropriate disposition patient. Patient is afebrile and nontoxic in appearance. Neuro exam unremarkable. NIH score 0. Labs reveal mild [email or web address] likely reactive as patient did experience an episode of vomiting upon symptoms onset. No anemia. Metabolic panel CO2 20, hypoglycemia to 49 mg/dL, otherwise unremarkable. Urinalysis reveals urine glucose 250, small, ketones trace, otherwise unremarkable. CXR findings as above. CT findings as above. EKG interpreted by ED physician. Troponin negative. Patient was ambulated in the ED, gait was normal with no ataxia. Orthostatic vitals remarkable for change in heart rate upon standing. CTA head and neck with contrast are negative for vessel occlusion. Upon reevaluation, the patient reports that his symptoms are not alleviated by medications/fluids given in the ED. In room to discuss plan for admission with patient. He is unsure regarding whether or not he will admission. States that he wants to talk to his wife prior to making decision. SORAYA made aware of patient's hesitancy and shift which report was given to Ourense Margarita who will revisit/reevaluate patient regarding admission. See her note for further details.     FINAL IMPRESSION    Dizziness  Vertigo    PLAN  Admitted      (Please note that this note was completed with a voice recognition program.  Every attempt was made to edit the dictations, but inevitably there remain words that are mis-transcribed.)      Melinda Bradley, NARESH - CNP  11/30/21 Radha [address] Ana M Zafar, NARESH - CNP  12/02/21 0450

## 2021-12-01 ENCOUNTER — APPOINTMENT (OUTPATIENT)
Dept: MRI IMAGING | Age: 46
End: 2021-12-01
Payer: MEDICAID

## 2021-12-01 VITALS
TEMPERATURE: 98.4 F | WEIGHT: 217.59 LBS | HEIGHT: 71 IN | DIASTOLIC BLOOD PRESSURE: 86 MMHG | OXYGEN SATURATION: 98 % | RESPIRATION RATE: 16 BRPM | BODY MASS INDEX: 30.46 KG/M2 | SYSTOLIC BLOOD PRESSURE: 150 MMHG | HEART RATE: 76 BPM

## 2021-12-01 LAB
AMPHETAMINE SCREEN, URINE: ABNORMAL
ANION GAP SERPL CALCULATED.3IONS-SCNC: 11 MMOL/L (ref 3–16)
BARBITURATE SCREEN URINE: ABNORMAL
BASOPHILS ABSOLUTE: 0.1 K/UL (ref 0–0.2)
BASOPHILS RELATIVE PERCENT: 0.8 %
BENZODIAZEPINE SCREEN, URINE: ABNORMAL
BILIRUBIN URINE: NEGATIVE
BLOOD, URINE: NEGATIVE
BUN BLDV-MCNC: 8 MG/DL (ref 7–20)
CALCIUM SERPL-MCNC: 8.2 MG/DL (ref 8.3–10.6)
CANNABINOID SCREEN URINE: POSITIVE
CHLORIDE BLD-SCNC: 106 MMOL/L (ref 99–110)
CHOLESTEROL, TOTAL: 147 MG/DL (ref 0–199)
CLARITY: CLEAR
CO2: 23 MMOL/L (ref 21–32)
COCAINE METABOLITE SCREEN URINE: ABNORMAL
COLOR: YELLOW
CREAT SERPL-MCNC: 0.9 MG/DL (ref 0.9–1.3)
EKG ATRIAL RATE: 71 BPM
EKG DIAGNOSIS: NORMAL
EKG P AXIS: 56 DEGREES
EKG P-R INTERVAL: 158 MS
EKG Q-T INTERVAL: 380 MS
EKG QRS DURATION: 88 MS
EKG QTC CALCULATION (BAZETT): 412 MS
EKG R AXIS: -4 DEGREES
EKG T AXIS: 34 DEGREES
EKG VENTRICULAR RATE: 71 BPM
EOSINOPHILS ABSOLUTE: 0.1 K/UL (ref 0–0.6)
EOSINOPHILS RELATIVE PERCENT: 1.2 %
ESTIMATED AVERAGE GLUCOSE: 165.7 MG/DL
GFR AFRICAN AMERICAN: >60
GFR NON-AFRICAN AMERICAN: >60
GLUCOSE BLD-MCNC: 129 MG/DL (ref 70–99)
GLUCOSE BLD-MCNC: 155 MG/DL (ref 70–99)
GLUCOSE BLD-MCNC: 202 MG/DL (ref 70–99)
GLUCOSE URINE: >=1000 MG/DL
HBA1C MFR BLD: 7.4 %
HCT VFR BLD CALC: 39.6 % (ref 40.5–52.5)
HDLC SERPL-MCNC: 27 MG/DL (ref 40–60)
HEMOGLOBIN: 12.9 G/DL (ref 13.5–17.5)
KETONES, URINE: ABNORMAL MG/DL
LDL CHOLESTEROL CALCULATED: 99 MG/DL
LEUKOCYTE ESTERASE, URINE: NEGATIVE
LV EF: 58 %
LVEF MODALITY: NORMAL
LYMPHOCYTES ABSOLUTE: 2.5 K/UL (ref 1–5.1)
LYMPHOCYTES RELATIVE PERCENT: 24.2 %
Lab: ABNORMAL
MAGNESIUM: 2 MG/DL (ref 1.8–2.4)
MCH RBC QN AUTO: 32.6 PG (ref 26–34)
MCHC RBC AUTO-ENTMCNC: 32.6 G/DL (ref 31–36)
MCV RBC AUTO: 100.2 FL (ref 80–100)
METHADONE SCREEN, URINE: ABNORMAL
MICROSCOPIC EXAMINATION: ABNORMAL
MONOCYTES ABSOLUTE: 0.7 K/UL (ref 0–1.3)
MONOCYTES RELATIVE PERCENT: 6.9 %
NEUTROPHILS ABSOLUTE: 6.9 K/UL (ref 1.7–7.7)
NEUTROPHILS RELATIVE PERCENT: 66.9 %
NITRITE, URINE: NEGATIVE
OPIATE SCREEN URINE: ABNORMAL
OXYCODONE URINE: ABNORMAL
PDW BLD-RTO: 13.2 % (ref 12.4–15.4)
PERFORMED ON: ABNORMAL
PERFORMED ON: ABNORMAL
PH UA: 6
PH UA: 6 (ref 5–8)
PHENCYCLIDINE SCREEN URINE: ABNORMAL
PLATELET # BLD: 179 K/UL (ref 135–450)
PMV BLD AUTO: 10 FL (ref 5–10.5)
POTASSIUM SERPL-SCNC: 4.1 MMOL/L (ref 3.5–5.1)
PROPOXYPHENE SCREEN: ABNORMAL
PROTEIN UA: NEGATIVE MG/DL
RBC # BLD: 3.95 M/UL (ref 4.2–5.9)
SODIUM BLD-SCNC: 140 MMOL/L (ref 136–145)
SPECIFIC GRAVITY UA: >1.03 (ref 1–1.03)
TRIGL SERPL-MCNC: 105 MG/DL (ref 0–150)
URINE REFLEX TO CULTURE: ABNORMAL
URINE TYPE: ABNORMAL
UROBILINOGEN, URINE: 2 E.U./DL
VLDLC SERPL CALC-MCNC: 21 MG/DL
WBC # BLD: 10.4 K/UL (ref 4–11)

## 2021-12-01 PROCEDURE — 70551 MRI BRAIN STEM W/O DYE: CPT

## 2021-12-01 PROCEDURE — 83036 HEMOGLOBIN GLYCOSYLATED A1C: CPT

## 2021-12-01 PROCEDURE — 85025 COMPLETE CBC W/AUTO DIFF WBC: CPT

## 2021-12-01 PROCEDURE — 6360000002 HC RX W HCPCS: Performed by: STUDENT IN AN ORGANIZED HEALTH CARE EDUCATION/TRAINING PROGRAM

## 2021-12-01 PROCEDURE — 80307 DRUG TEST PRSMV CHEM ANLYZR: CPT

## 2021-12-01 PROCEDURE — 6370000000 HC RX 637 (ALT 250 FOR IP): Performed by: STUDENT IN AN ORGANIZED HEALTH CARE EDUCATION/TRAINING PROGRAM

## 2021-12-01 PROCEDURE — G0378 HOSPITAL OBSERVATION PER HR: HCPCS

## 2021-12-01 PROCEDURE — 83735 ASSAY OF MAGNESIUM: CPT

## 2021-12-01 PROCEDURE — 81003 URINALYSIS AUTO W/O SCOPE: CPT

## 2021-12-01 PROCEDURE — 96372 THER/PROPH/DIAG INJ SC/IM: CPT

## 2021-12-01 PROCEDURE — 93306 TTE W/DOPPLER COMPLETE: CPT

## 2021-12-01 PROCEDURE — 80048 BASIC METABOLIC PNL TOTAL CA: CPT

## 2021-12-01 PROCEDURE — 97161 PT EVAL LOW COMPLEX 20 MIN: CPT

## 2021-12-01 PROCEDURE — 93010 ELECTROCARDIOGRAM REPORT: CPT | Performed by: INTERNAL MEDICINE

## 2021-12-01 PROCEDURE — 97530 THERAPEUTIC ACTIVITIES: CPT

## 2021-12-01 PROCEDURE — 80061 LIPID PANEL: CPT

## 2021-12-01 RX ORDER — ASPIRIN 81 MG/1
81 TABLET ORAL DAILY
Qty: 30 TABLET | Refills: 3 | Status: SHIPPED | OUTPATIENT
Start: 2021-12-02 | End: 2022-03-17 | Stop reason: SDUPTHER

## 2021-12-01 RX ORDER — MECLIZINE HCL 12.5 MG/1
12.5 TABLET ORAL 3 TIMES DAILY PRN
Status: DISCONTINUED | OUTPATIENT
Start: 2021-12-01 | End: 2021-12-01 | Stop reason: HOSPADM

## 2021-12-01 RX ADMIN — ATORVASTATIN CALCIUM 20 MG: 20 TABLET, FILM COATED ORAL at 10:28

## 2021-12-01 RX ADMIN — ENOXAPARIN SODIUM 40 MG: 40 INJECTION SUBCUTANEOUS at 10:28

## 2021-12-01 RX ADMIN — INSULIN GLARGINE 30 UNITS: 100 INJECTION, SOLUTION SUBCUTANEOUS at 05:49

## 2021-12-01 RX ADMIN — ASPIRIN 81 MG: 81 TABLET ORAL at 10:27

## 2021-12-01 ASSESSMENT — PAIN SCALES - GENERAL
PAINLEVEL_OUTOF10: 4

## 2021-12-01 NOTE — H&P
Past Surgical History:        Procedure Laterality Date    HIP SURGERY  01/2013    OTHER SURGICAL HISTORY Right 1/14/2013    excision of mass right hip       Medications Prior to Admission:    Prior to Admission medications    Medication Sig Start Date End Date Taking?  Authorizing Provider   ibuprofen (ADVIL;MOTRIN) 600 MG tablet Take 1 tablet by mouth 4 times daily as needed for Pain 10/30/21   NARESH Victoria CNP   atorvastatin (LIPITOR) 20 MG tablet Take 1 tablet by mouth daily 9/1/21 11/30/21  Terrence Morales MD   Dulaglutide 0.75 MG/0.5ML SOPN Inject 0.75 mg into the skin once a week 9/1/21   Terrence Morales MD   insulin glargine [facility]) 100 UNIT/ML injection pen ADMINISTER 50 UNITS UNDER THE SKIN EVERY DAY 9/1/21   Terrence Morales MD   blood glucose test strips (ONETOUCH ULTRA) strip TEST FOUR TIMES DAILY 9/1/21   Terrence Morales MD   omeprazole (PRILOSEC) 40 MG delayed release capsule Take 1 capsule by mouth daily 9/1/21   Terrence Morales MD   lisinopril (PRINIVIL;ZESTRIL) 5 MG tablet Take 1 tablet by mouth daily 9/1/21   Terrence Morales MD   insulin lispro, 1 Unit Dial, 100 UNIT/ML SOPN 30 units into the skin three items daily before meals 9/1/21   Terrence Morales MD   Alcohol Swabs 70 % PADS Apply 1 Units topically 4 times daily 9/1/21   Terrence Morales MD   FreeStyle Lancets MISC 100 each by Does not apply route 3 times daily 9/1/21   Terrence Morales MD   Insulin Pen Needle (BD PEN NEEDLE VIBHA U/F) 32G X 4 MM MISC USE AS DIRECTED FOUR TIMES DAILY 9/1/21   Terrence Morales MD   Blood Glucose Monitoring Suppl (ONE TOUCH ULTRA 2) w/Device KIT 1 kit by Does not apply route daily 4/8/21   Terrence Morales MD   ketorolac (TORADOL) 10 MG tablet Take 1 tablet by mouth every 6 hours as needed for Pain 4/6/21   Comfort Byrd MD   ibuprofen (ADVIL;MOTRIN) 600 MG tablet Take 1 tablet by mouth 3 times daily as needed for Pain 11/12/20   Alison CHAVEZ Yvette Vargas MD   baclofen (LIORESAL) 10 MG tablet TAKE 1 TABLET BY MOUTH TWICE DAILY 3/9/20   Halina Soriano MD   indomethacin (INDOCIN) 50 MG capsule Take 50 mg by mouth 3 times daily 12/12/18 4/6/21  Historical Provider, MD       Allergies:  Dilaudid [hydromorphone]    Social History:  The patient currently lives home    TOBACCO:   reports that he has quit smoking. He quit smokeless tobacco use about 14 years ago. ETOH:   reports no history of alcohol use. Family History:  Reviewed in detail and negative for DM, Early CAD, Cancer, CVA. Positive as follows:        Problem Relation Age of Onset    Diabetes Maternal Grandmother        REVIEW OF SYSTEMS:    as noted in the HPI. All other systems reviewed and negative. PHYSICAL EXAM:    /81   Pulse 71   Temp 98.7 °F (37.1 °C) (Oral)   Resp 16   Ht 5' 10.5\" (1.791 m)   Wt 217 lb 9.5 oz (98.7 kg)   SpO2 97%   BMI 30.78 kg/m²     General appearance: No apparent distress appears stated age and cooperative. HEENT Normal cephalic, atraumatic without obvious deformity. Pupils equal, round, and reactive to light. Extra ocular muscles intact. Conjunctivae/corneas clear. Neck: Supple, No jugular venous distention/bruits. Trachea midline without thyromegaly or adenopathy with full range of motion. Lungs: Clear to auscultation, bilaterally without Rales/Wheezes/Rhonchi with good respiratory effort. Heart: Regular rate and rhythm with Normal S1/S2 without murmurs, rubs or gallops, point of maximum impulse non-displaced  Abdomen: Soft, non-tender or non-distended without rigidity or guarding and positive bowel sounds all four quadrants. Extremities: No clubbing, cyanosis, or edema bilaterally. Full range of motion without deformity and normal gait intact. Skin: Skin color, texture, turgor normal.  No rashes or lesions.   Neurologic: Cranial nerves II through XII seem to be intact, EOMI is noted, PERRLA is noted, no visual field deficits are noted no nystagmus is noted on testing at this time although it may have been previously noted per the ED provider, sensation and motor seems to be intact 5 out of 5 to all extremities  Mental status: Alert, oriented, thought content appropriate. Capillary Refill: Acceptable  < 3 seconds  Peripheral Pulses: +3 Easily felt, not easily obliterated with pressure          CBC   Recent Labs     11/30/21  1626 12/01/21  0806   WBC 11.4* 10.4   HGB 14.6 12.9*   HCT 46.3 39.6*    179      RENAL  Recent Labs     11/30/21  1626 12/01/21  0806    140   K 4.1 4.1    106   CO2 20* 23   BUN 9 8   CREATININE 0.9 0.9     LFT'S  No results for input(s): AST, ALT, ALB, BILIDIR, BILITOT, ALKPHOS in the last 72 hours. COAG  No results for input(s): INR in the last 72 hours.   CARDIAC ENZYMES  Recent Labs     11/30/21  1626   TROPONINI <0.01       U/A:    Lab Results   Component Value Date    COLORU YELLOW 12/01/2021    CLARITYU Clear 12/01/2021    SPECGRAV >1.030 12/01/2021    LEUKOCYTESUR Negative 12/01/2021    BLOODU Negative 12/01/2021    GLUCOSEU >=1000 12/01/2021    GLUCOSEU >=1000 04/11/2012       ABG  No results found for: DMS2PAO, BEART, I3NNATYT, PHART, THGBART, TJD1JYF, PO2ART, YGM0LUO        Active Hospital Problems    Diagnosis Date Noted    Dizziness [R42] 11/30/2021    Diabetes mellitus ([address] [E11.9]     Hyperlipidemia [E78.5] 07/09/2019    Essential hypertension [I10] 07/09/2019         PHYSICIANS CERTIFICATION:    I certify that Oralia Cuellar is expected to be hospitalized for less than 2 midnights based on the following assessment and plan:      ASSESSMENT/PLAN:  · Dizziness  · Type 2 diabetes  · Hyperlipidemia  · Hypertension    Plan:  · Patient's dizziness seems to be of uncertain etiology, seems to be stable although patient notes some component of lightheadedness and dizziness worse with posture changes, there was some concern that his blood pressure was slightly lower when he first presented to the ED.  He was not having any of the symptoms prior to today. · CT scans of the head and neck were overall unremarkable. · Obtaining orthostatic vital signs  · Obtaining MRI of the brain without contrast for further evaluation of ischemic process  · Obtain transthoracic echo for further evaluation of presyncope  · If MRI does show concerning findings then can consider neurology consult later  · Repeat labs in the morning  · Restart patient's home medications started on aspirin and statin    DVT Prophylaxis: Lovenox  Diet: Diet NPO  Code Status: Full Code  PT/OT Eval Status: Ambulatory    Dispo -observation       To Gilbert DO    Thank you Gladys Boyle MD for the opportunity to be involved in this patient's care. If you have any questions or concerns please feel free to contact me at [phone].

## 2021-12-01 NOTE — PROGRESS NOTES
Physical Therapy    Facility/Department: [address] [place] [address]  Initial Assessment/Discharge Summary    NAME: Devaughn Westbrook  : 1975  MRN: [id number]    Date of Service: 2021    Discharge Recommendations:  Home with assist PRN   PT Equipment Recommendations  Equipment Needed: No    Assessment   Assessment: 39 y/o male admit 2021 with Dizziness/Vertigo. CT/MRI Head negative. Pt reports mild lighthead with eob/oob. Vestibular negative. No drop BP with positional changes. Pt karthikeyan oob, short distance at bedside without LE buckling/giving way. No further PT indicated at this time. Prognosis: Good  Decision Making: Low Complexity  History: 39 y/o male admit 2021 with Dizziness/Vertigo. CT/MRI Head negative. Exam: See above. Clinical Presentation: Seee above. Patient Education: Role of PT, POC, Need to call for assist.  Barriers to Learning: None. REQUIRES PT FOLLOW UP: No  Activity Tolerance  Activity Tolerance: Pt reports mild lighthead with eob/oob. Vestibular negative. No drop BP with positional changes. Pt karthikeyan oob, short distance at bedside without LE buckling/giving way. Patient Diagnosis(es): The primary encounter diagnosis was Dizziness. A diagnosis of Vertigo was also pertinent to this visit. has a past medical history of Diabetes mellitus ([address] Hyperlipidemia, Hypertension, Neuropathy, and Psychiatric problem. has a past surgical history that includes other surgical history (Right, 2013) and hip surgery (2013). Restrictions  Restrictions/Precautions  Restrictions/Precautions: Up as Tolerated  Position Activity Restriction  Other position/activity restrictions: BP Supine : 133/94 (107). EOB : 137/92 (107). Stand, OOB to chair : 145/94 (110).   Vision/Hearing  Vision: Within Functional Limits  Hearing: Within functional limits     Subjective  General  Chart Reviewed: Yes  Patient assessed for rehabilitation services?: Yes  Additional Pertinent Hx: 41 y/o male admit 11/30/2021 with Dizziness/Vertigo. CT/MRI Head negative. Family / Caregiver Present: No  Referring Practitioner: Dr. Gorman Reasons  Diagnosis: Dizziness/Vertigo  Follows Commands: Within Functional Limits  Subjective  Subjective: Pt agreeable to PT Eval/Rx. Orientation  Orientation  Overall Orientation Status: Within Functional Limits  Social/Functional History  Social/Functional History  Lives With: Family (Wife, Dtr.)  ADL Assistance: Independent  Ambulation Assistance: Independent (Without assist device pta.)  Transfer Assistance: Independent  Active : Yes  Type of occupation: Starting new job at [employer]. Additional Comments: Pt reports he, his wife and dtr (15 yr) have been staying in a motel; will be moving in to apt tomorrow (3rd floor, level entry building). Wife also in ER today. Cognition   Cognition  Overall Cognitive Status: WFL    Objective          AROM RLE (degrees)  RLE AROM: WFL  AROM LLE (degrees)  LLE AROM : WFL  AROM RUE (degrees)  RUE AROM : WFL  AROM LUE (degrees)  LUE AROM : WFL           Bed mobility  Supine to Sit: Independent  Sit to Supine: Independent  Comment: Pt c/o mild nausea/lighthead. No Nastagmus with bed mob, moving to eob. No reproducible sxs with changes in head positions. Transfers  Sit to Stand: Supervision  Stand to sit: Supervision  Ambulation  Ambulation?: Yes  Ambulation 1  Device: No Device  Distance: Pt amb few steps away from bedside, ~ 10'  without LE weak or buckling. No LOB noted. Plan   Plan  Times per week: D/C Acute Care PT Services.   Safety Devices  Type of devices: Call light within reach, Left in bed, Nurse notified             AM-PAC Score  AM-PAC Inpatient Mobility Raw Score : 21 (12/01/21 1401)  AM-PAC Inpatient T-Scale Score : 50.25 (12/01/21 1401)  Mobility Inpatient CMS 0-100% Score: 28.97 (12/01/21 1401)  Mobility Inpatient CMS G-Code Modifier : Destin Born (12/01/21 1401) Goals  Short term goals  Time Frame for Short term goals: No Acute Care PT Goals Identified. Patient Goals   Patient goals : Feel better.        Therapy Time   Individual Concurrent Group Co-treatment   Time In 1150         Time Out 1220         Minutes [address]

## 2021-12-01 NOTE — DISCHARGE SUMMARY
Hospital Medicine Discharge Summary    Patient: Vivien Gonzales     Gender: male  : 1975   Age: 40 y.o. MRN: [id number]    Admitting Physician: Iona Schirmer, DO  Discharge Physician: Ted Luke MD     Code Status: Prior     Admit Date: 2021   Discharge Date:   21    Disposition:  Home    Discharge Diagnoses:  1. Vertigo + dizziness  2. Type II Diabetes Mellitus  3. Hypertension  4. Hyperlipidemia. Follow-up appointments:  two weeks    Outpatient to do list: FU with PCP and possible cardiology FU    Condition at Discharge:  [address] Course:   he patient is a 40 y.o. male with past history of type 2 diabetes, hyperlipidemia, hypertension, and depression who presents to [facility] with concern that after dropping his wife off for evaluation in the ED, he was initially sitting in his car but then started developing new onset room spinning sensation and lightheadedness while just sitting in the car. He had used marijuanna and was stinking of cannabis smoke on admission and today. eLonor Harris He thinks it was triggered by his wifes illness- admitted to the ED. He denied LOC but was hypotensive. No h/o strokes. No infective symptoms. He had no focal neurological signs on exam and CT and MRI brain as well as echo were nondiagnostic. He says he used to have dizzy spells long ago and hd a holter monitor. He has been advised to FU with cardiology as an outpatient to assess him if he needed another holter monitor.       Discharge Medications:   Discharge Medication List as of 2021  3:42 PM      START taking these medications    Details   aspirin 81 MG EC tablet Take 1 tablet by mouth daily, Disp-30 tablet, R-3Normal           Discharge Medication List as of 2021  3:42 PM        Discharge Medication List as of 2021  3:42 PM      CONTINUE these medications which have NOT CHANGED    Details   ibuprofen (ADVIL;MOTRIN) 600 MG tablet Take 1 tablet by mouth 4 times daily as needed for Pain, Disp-30 tablet, R-0Print      atorvastatin (LIPITOR) 20 MG tablet Take 1 tablet by mouth daily, Disp-90 tablet, R-0Normal      Dulaglutide 0.75 MG/0.5ML SOPN Inject 0.75 mg into the skin once a week, Disp-4 pen, R-5Normal      insulin glargine (BASAGLAR KWIKPEN) 100 UNIT/ML injection pen ADMINISTER 50 UNITS UNDER THE SKIN EVERY DAY, Disp-15 mL, R-5Normal      blood glucose test strips (ONETOUCH ULTRA) strip TEST FOUR TIMES DAILY, Disp-400 strip, R-5Normal      omeprazole (PRILOSEC) 40 MG delayed release capsule Take 1 capsule by mouth daily, Disp-30 capsule, R-5Normal      lisinopril (PRINIVIL;ZESTRIL) 5 MG tablet Take 1 tablet by mouth daily, Disp-30 tablet, R-5Normal      insulin lispro, 1 Unit Dial, 100 UNIT/ML SOPN 30 units into the skin three items daily before meals, Disp-15 mL, R-5Normal      Alcohol Swabs 70 % PADS 4 TIMES DAILY Starting Wed 9/1/2021, Disp-120 each, R-5, Normal      FreeStyle Lancets MISC 3 TIMES DAILY Starting Wed 9/1/2021, Disp-100 each, R-6, Normal      Insulin Pen Needle (BD PEN NEEDLE VIBHA U/F) 32G X 4 MM MISC Disp-100 each, R-5, NormalUSE AS DIRECTED FOUR TIMES DAILY      Blood Glucose Monitoring Suppl (ONE TOUCH ULTRA 2) w/Device KIT DAILY Starting u 4/8/2021, Disp-1 kit, R-0, Normal      ketorolac (TORADOL) 10 MG tablet Take 1 tablet by mouth every 6 hours as needed for Pain, Disp-20 tablet, R-0Normal      baclofen (LIORESAL) 10 MG tablet TAKE 1 TABLET BY MOUTH TWICE DAILY, Disp-60 tablet, R-5Normal           Discharge Medication List as of 12/1/2021  3:42 PM      STOP taking these medications       indomethacin (INDOCIN) 50 MG capsule Comments:   Reason for Stopping:               Discharge ROS:  A complete review of systems was asked and negative except for above     Discharge Exam:    BP (!) 150/86   Pulse 76   Temp 98.4 °F (36.9 °C) (Oral)   Resp 16   Ht 5' 10.5\" (1.791 m)   Wt 217 lb 9.5 oz (98.7 kg)   SpO2 98%   BMI 30.78 kg/m²   General appearance: No apparent distress, appears stated age and cooperative. HEENT: Pupils equal, round, and reactive to light. Conjunctivae/corneas clear. Neck: Supple, with full range of motion. No jugular venous distention. Trachea midline. Respiratory:  Normal respiratory effort. Clear to auscultation, bilaterally without Rales/Wheezes/Rhonchi. Cardiovascular: Regular rate and rhythm with normal S1/S2 without murmurs, rubs or gallops. Abdomen: Soft, non-tender, non-distended with normal bowel sounds. Musculoskeletal: No clubbing, cyanosis or edema bilaterally. Full range of motion without deformity. Skin: Skin color, texture, turgor normal.  No rashes or lesions. Neurologic:  Neurovascularly intact without any focal sensory/motor deficits. Cranial nerves: II-XII intact, grossly non-focal.  Psychiatric: Alert and oriented, thought content appropriate, normal insight  Capillary Refill: Brisk,3 seconds, normal   Peripheral Pulses: +2 palpable, equal bilaterally   Labs:  For convenience and continuity at follow-up the following most recent labs are provided:    Lab Results   Component Value Date    WBC 10.4 12/01/2021    HGB 12.9 12/01/2021    HCT 39.6 12/01/2021    .2 12/01/2021     12/01/2021     12/01/2021    K 4.1 12/01/2021    K 4.6 04/06/2021     12/01/2021    CO2 23 12/01/2021    BUN 8 12/01/2021    CREATININE 0.9 12/01/2021    CALCIUM 8.2 12/01/2021    PHOS 3.3 01/14/2013    BNP <5.0 09/21/2012    ALKPHOS 107 04/06/2021    ALT 18 04/06/2021    AST 17 04/06/2021    BILITOT 0.4 04/06/2021    BILIDIR 0.20 11/23/2013    LABALBU 4.3 04/06/2021    LDLCALC 99 12/01/2021    TRIG 105 12/01/2021     Lab Results   Component Value Date    INR 0.94 01/13/2013    INR 0.99 09/28/2012    INR 0.94 09/21/2012       Radiology:  Echo Complete    Result Date: 12/1/2021  Transthoracic Echocardiography Report (TTE)  Demographics   Patient Name       Ino Harris   Date of Study      12/01/2021 Gender              Male   Patient Number     [id number]         Date of Birth       1975   Visit Number       [id number]          Age                 40 year(s)   Accession Number   [id number]         Room Number         [address] [place]       [id number]            Sonographer         Jaleel Varma RDCS, T   Ordering Physician [id number], [facility].                     DO                 Physician           Mahesh Lundberg MD  Procedure Type of Study   TTE procedure:ECHOCARDIOGRAM COMPLETE WITH BUBBLE STUDY. Procedure Date Date: 12/01/2021 Start: 01:27 PM Study Location: [facility] [address] Echo Lab Technical Quality: Adequate visualization Additional Indications:Dizziness. H/o HTN, HLD, diabetes mellitus. Patient Status: Routine Contrast Medium: Bubble Study. Height: 70 inches Weight: 217 pounds BSA: 2.16 m2 BMI: 31.14 kg/m2 Rhythm: Within normal limits HR: 74 bpm BP: 132/84 mmHg  Conclusions   Summary  Overall left ventricular systolic function appears normal.  Ejection fraction is visually estimated to be 55-60%. No regional wall motion abnormalities are noted. grade 1 diastolic dysfunction  The right ventricle is normal in size and function. No significant valvular heart disease   Signature   ------------------------------------------------------------------  Electronically signed by Mahesh Lundberg MD (Interpreting  physician) on 12/01/2021 at 02:11 PM  ------------------------------------------------------------------   Findings   Left Ventricle  Left ventricular cavity size is normal.  There is mildly increased left ventricular wall thickness. Overall left ventricular systolic function appears normal.  Ejection fraction is visually estimated to be 55-60%. No regional wall motion abnormalities are noted.   grade 1 diastolic dysfunction  Stroke volume index calculated 33.80 mL/m2/beat. Mitral Valve  The mitral valve appears structurally normal.  Trivial mitral regurgitation. No evidence of mitral stenosis. Left Atrium  The left atrium is normal in size. Aortic Valve  The aortic valve appears normal in structure. Trivial aortic regurgitation. No evidence of aortic valve regurgitation. Aorta  The aortic root is normal in size. Right Ventricle  The right ventricle is normal in size and function. Tricuspid Valve  The tricuspid valve is normal in structure and function. There is no  significant tricuspid valve regurgitation or stenosis. Right Atrium  The right atrial size is normal.   Pulmonic Valve  The pulmonic valve is not well visualized. There is no evidence of pulmonic valve regurgitation or stenosis. Pericardial Effusion  No pericardial effusion noted. Pleural Effusion  No pleural effusion. Miscellaneous  IVC size is dilated (>2.1 cm) and collapses < 50% with respiration  consistent with elevated RA pressure (15 mmHg). Unable to estimate pulmonary artery pressure secondary to incomplete TR jet  envelope.   M-Mode/2D Measurements (cm)   LV Diastolic Dimension: 1.09 cm LV Systolic Dimension: 6.55 cm  LV Septum Diastolic: 1.1 cm  LV PW Diastolic: 6.82 cm        AO Root Dimension: 3.2 cm  RV Diastolic Dimension: 5.97 cm                                  LA Area: 19.5 cm2  LVOT: 2.3 cm                    LA volume/Index: 67 ml /31 ml/m2  Doppler Measurements   AV Peak Velocity: 124 cm/s     MV Peak E-Wave: 56.6 cm/s  AV Peak Gradient: 6.15 mmHg    MV Peak A-Wave: 57.6 cm/s  AV Mean Gradient: 3 mmHg       MV E/A Ratio: 0.98  LVOT Peak Velocity: 91.2 cm/s  MV Mean Gradient: 1 mmHg  AV Area (Continuity):2.96 cm2  MV Max P mmHg                                 MV Vmax:77.1 cm/s                                 MV VTI:19.1 cm/s   E' Septal Velocity: 6.91 cm/s  MV Area (continuity): 3.85 cm2  E' Lateral Velocity: 11.2 cm/s MV Deceleration SOFT TISSUES/SKULL:  There is a mild right parieto-occipital scalp hematoma, without acute skull fracture. There is no acute osseous abnormality of the skull base or calvarium. 1. No acute intracranial abnormality. 2. Mild high right parieto-occipital scalp hematoma, without acute skull fracture or acute intracranial hemorrhage. XR CHEST PORTABLE    Result Date: 11/30/2021  EXAMINATION: ONE XRAY VIEW OF THE CHEST 11/30/2021 4:30 pm COMPARISON: Chest CTA 04/06/2021, chest radiograph 04/06/2021 HISTORY: ORDERING SYSTEM PROVIDED HISTORY: dizziness/cp w/u TECHNOLOGIST PROVIDED HISTORY: Reason for exam:->dizziness/cp w/u FINDINGS: Clear lungs. No definite findings of pneumothorax or pleural effusion. Normal mediastinal, hilar, and cardiac contours. No obvious acute fracture. Joints maintain anatomic alignment. No acute findings in the chest.     CTA HEAD NECK W CONTRAST    Result Date: 11/30/2021  EXAMINATION: CTA OF THE HEAD AND NECK WITH CONTRAST 11/30/2021 6:15 pm: TECHNIQUE: CTA of the head and neck was performed with the administration of intravenous contrast. Multiplanar reformatted images are provided for review. MIP images are provided for review. Stenosis of the internal carotid arteries measured using NASCET criteria. Dose modulation, iterative reconstruction, and/or weight based adjustment of the mA/kV was utilized to reduce the radiation dose to as low as reasonably achievable. COMPARISON: None. HISTORY: ORDERING SYSTEM PROVIDED HISTORY: dizziness TECHNOLOGIST PROVIDED HISTORY: Reason for exam:->dizziness Decision Support Exception - unselect if not a suspected or confirmed emergency medical condition->Emergency Medical Condition (MA) Reason for Exam: vertigo Acuity: Acute Type of Exam: Initial FINDINGS: CTA NECK: AORTIC ARCH/ARCH VESSELS: There is no abnormality of the vessels as they originate from the aortic arch.  CAROTID ARTERIES: The common carotid arteries are patent bilaterally without narrowing or stenosis. The internal carotid arteries are patent bilaterally without narrowing or stenosis. VERTEBRAL ARTERIES: The vertebral arteries are patent bilaterally without any narrowing or stenosis. SOFT TISSUES: There is no acute abnormality of the soft tissues of the neck. BONES: No acute osseous abnormality. CTA HEAD: ANTERIOR CIRCULATION:  No abnormality of the internal carotid arteries, middle cerebral arteries, or anterior cerebral arteries are identified. There is a hypoplastic A1 segment on the right. There is some irregularity of the anterior communicating artery and a small aneurysm cannot be excluded. POSTERIOR CIRCULATION:  No abnormality of the distal vertebral arteries, basilar artery, or posterior cerebral arteries are identified. OTHER: No dural venous sinus thrombosis on this non-dedicated study. BRAIN: Refer to the CT head of the same date. No significant abnormality of the neck vessels. 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 No other abnormality of the intracranial circulation. MRI BRAIN WO CONTRAST    Result Date: 12/1/2021  EXAMINATION: MRI OF THE BRAIN WITHOUT CONTRAST  12/1/2021 8:30 am TECHNIQUE: Multiplanar multisequence MRI of the brain was performed without the administration of intravenous contrast. COMPARISON: None. HISTORY: ORDERING SYSTEM PROVIDED HISTORY: dizziness TECHNOLOGIST PROVIDED HISTORY: Reason for exam:->dizziness Reason for Exam: Dizziness Acuity: Acute Type of Exam: Initial FINDINGS: INTRACRANIAL STRUCTURES/VENTRICLES: There is no acute infarct. No mass effect or midline shift. No evidence of an acute intracranial hemorrhage. The ventricles and sulci are normal in size and configuration. The sellar/suprasellar regions appear unremarkable. The normal signal voids within the major intracranial vessels appear maintained. ORBITS: The visualized portion of the orbits demonstrate no acute abnormality.  SINUSES: Minimal scattered mucosal thickening of the paranasal sinuses. The mastoid air cells demonstrate no acute abnormality. BONES/SOFT TISSUES: The bone marrow signal intensity appears normal.  There is minimal right parietal scalp swelling. No acute intracranial abnormality. No acute infarct. EKG     Rhythm: normal sinus   Rate: normal  Clinical Impression: no acute changes        The patient was seen and examined on day of discharge and this discharge summary is in conjunction with any daily progress note from day of discharge. Time Spent on discharge is 30 minutes  in the examination, evaluation, counseling and review of medications and discharge plan. Note that more than 30 minutes was spent in preparing discharge papers, discussing discharge with patient, medication review, etc.       Signed:    Ancil Phalen, MD   12/1/2021      Thank you Radha Choi MD for the opportunity to be involved in this patient's care.  If you have any questions or concerns please feel free to contact me at [facility]

## 2021-12-01 NOTE — ED NOTES
Pt back from MRI. Placed on cardiac monitor. Meal tray ordered.      Lopez Ballesteros, LISBET  12/01/21 7915

## 2021-12-01 NOTE — PROGRESS NOTES
Speech Language Pathology    Evaluation attempted. Patient unavailable out of room at echo. RN denies concerns re: SLP needs at this time. ST to re-attempt as schedule permits unless otherwise notified. IF SLP eval/tx is deemed no longer indicated as medical work-up progresses, please discontinue SLP eval/tx order. Thank you. Sandra Sousa, #[id number]  Speech-Language Pathologist  Portable phone: [phone]

## 2021-12-01 NOTE — DISCHARGE INSTR - COC
Planned Expiration Resolved Resolved By    None active    Resolved    COVID-19 (Rule Out) 21 COVID-19, Rapid (Ordered)   21 Rule-Out Test Resulted            Nurse Assessment:  Last Vital Signs: /87   Pulse 71   Temp 97.3 °F (36.3 °C) (Oral)   Resp 15   Ht 5' 10.5\" (1.791 m)   Wt 217 lb 9.5 oz (98.7 kg)   SpO2 100%   BMI 30.78 kg/m²     Last documented pain score (0-10 scale): Pain Level: 7  Last Weight:   Wt Readings from Last 1 Encounters:   21 217 lb 9.5 oz (98.7 kg)     Mental Status:  {IP PT MENTAL STATUS:}    IV Access:  { ROMA IV ACCESS:660159195}    Nursing Mobility/ADLs:  Walking   {CHP DME ZWET:505228208}  Transfer  {CHP DME GJIU:380488900}  Bathing  {CHP DME LGY}  Dressing  {CHP DME EBTL:051393174}  Toileting  {CHP DME GXUY:335777899}  Feeding  {CHP DME DNHJ:128597946}  Med Admin  {P DME NTUB:916069190}  Med Delivery   { ROMA MED Delivery:408541453}    Wound Care Documentation and Therapy:  Incision 13 Hip Anterior;Right (Active)   Number of days: 3242        Elimination:  Continence: Bowel: {YES / GB:88181}  Bladder: {YES / RH:75221}  Urinary Catheter: {Urinary Catheter:611582177}   Colostomy/Ileostomy/Ileal Conduit: {YES / OF:37762}       Date of Last BM: ***  No intake or output data in the 24 hours ending 21 2256  No intake/output data recorded.     Safety Concerns:     [address] Safety Concerns:516503235}    Impairments/Disabilities:      [address] Impairments/Disabilities:379453576}    Nutrition Therapy:  Current Nutrition Therapy:   [address] Diet List:797923135}    Routes of Feeding: {P DME Other Feedings:999803240}  Liquids: {Slp liquid thickness:72319}  Daily Fluid Restriction: {CHP DME Yes amt example:066393160}  Last Modified Barium Swallow with Video (Video Swallowing Test): {Done Not Done DSZN:946530935}    Treatments at the Time of Hospital Discharge:   Respiratory Treatments: ***  Oxygen Therapy:  {Therapy; copd oxygen:48492}  Ventilator:    { CC Vent UGSR:242538835}    Rehab Therapies: {THERAPEUTIC INTERVENTION:7373023326}  Weight Bearing Status/Restrictions: { CC Weight Bearin}  Other Medical Equipment (for information only, NOT a DME order):  {EQUIPMENT:570916833}  Other Treatments: ***    Patient's personal belongings (please select all that are sent with patient):  {CHP DME Belongings:017444191}    RN SIGNATURE:  {Esignature:919876738}    CASE MANAGEMENT/SOCIAL WORK SECTION    Inpatient Status Date: ***    Readmission Risk Assessment Score:  Readmission Risk              Risk of Unplanned Readmission:  0           Discharging to Facility/ Agency   Name:   Address:  Phone:  Fax:    Dialysis Facility (if applicable)   Name:  Address:  Dialysis Schedule:  Phone:  Fax:    / signature: {Esignature:597165310}    PHYSICIAN SECTION    Prognosis: {Prognosis:7415636784}    Condition at Discharge: [address] [place] [address] Patient Condition:010960167}    Rehab Potential (if transferring to Rehab): {Prognosis:7488448089}    Recommended Labs or Other Treatments After Discharge: ***    Physician Certification: I certify the above information and transfer of Holley Tomlinson  is necessary for the continuing treatment of the diagnosis listed and that he requires {Admit to Appropriate Level of Care:86998} for {GREATER/LESS:774083154} 30 days.      Update Admission H&P: {CHP DME Changes in VDATW:124511851}    PHYSICIAN SIGNATURE:  {Esignature:496156494}

## 2021-12-02 ASSESSMENT — ENCOUNTER SYMPTOMS
ABDOMINAL PAIN: 0
ANAL BLEEDING: 0
EYE PAIN: 0
VOMITING: 1
BACK PAIN: 0
SORE THROAT: 0
NAUSEA: 1
SHORTNESS OF BREATH: 0
COUGH: 0
PHOTOPHOBIA: 0

## 2022-01-05 ENCOUNTER — TELEPHONE (OUTPATIENT)
Dept: FAMILY MEDICINE CLINIC | Age: 47
End: 2022-01-05

## 2022-01-05 NOTE — TELEPHONE ENCOUNTER
We have samples of basaglar insulin. May give patient 2 samples. If he can get here before 5:00 or have someone pick them up for him he can have them. Please document call and then close encounter.   thanks
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
## 2022-01-06 ENCOUNTER — TELEPHONE (OUTPATIENT)
Dept: FAMILY MEDICINE CLINIC | Age: 47
End: 2022-01-06
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
## 2022-01-06 NOTE — TELEPHONE ENCOUNTER
Please see if you can find info on prior PA request for med, I am not sure why he is on this insulin rather than some others, thank you

## 2022-01-27 ENCOUNTER — TELEPHONE (OUTPATIENT)
Dept: FAMILY MEDICINE CLINIC | Age: 47
End: 2022-01-27

## 2022-01-27 NOTE — TELEPHONE ENCOUNTER
Pt called in to the call center requesting refill and when call center transferred to me patient was not on line.  Call center did state he wanting refill but on checking his history he has cancelled or no showed the last 3 new to provider visits just an [facility]
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
## 2022-01-28 NOTE — TELEPHONE ENCOUNTER
Tried to call pt to reschedule appt. When dialed message that picked up was that \"this phone call cannot be completed at as diealed, please hang up and try your call again later\".

## 2022-02-02 NOTE — TELEPHONE ENCOUNTER
Medication:   Requested Prescriptions     Pending Prescriptions Disp Refills    Dulaglutide 0.75 MG/0.5ML SOPN 4 pen 5     Sig: Inject 0.75 mg into the skin once a week       Last Filled:      Patient Phone Number: [phone] (home)     Last appt: 9/1/2021   Next appt: Visit date not found    Last Labs DM:   Lab Results   Component Value Date    LABA1C 7.4 12/01/2021

## 2022-02-14 ENCOUNTER — TELEPHONE (OUTPATIENT)
Dept: FAMILY MEDICINE CLINIC | Age: 47
End: 2022-02-14

## 2022-02-14 NOTE — TELEPHONE ENCOUNTER
Pt called in requesting refills for the following medications  Insulin glargdine  Insulin lispro, 1 unit dial, 100 unit/ml sopn  Insulin pen needle  Lisinopril  Atorvastatin  Alcohol swabs  Ibuprofen  trulicity 75 (one pen a week)  Pt is set to establish care on 3/17/22.

## 2022-02-14 NOTE — TELEPHONE ENCOUNTER
Medication:   Requested Prescriptions     Pending Prescriptions Disp Refills    Dulaglutide 0.75 MG/0.5ML SOPN 4 pen 2     Sig: Inject 0.75 mg into the skin once a week    insulin glargine (BASAGLAR KWIKPEN) 100 UNIT/ML injection pen 15 mL 2     Sig: ADMINISTER 50 UNITS UNDER THE SKIN EVERY DAY    insulin lispro, 1 Unit Dial, 100 UNIT/ML SOPN 15 mL 2     Si units into the skin three items daily before meals    Insulin Pen Needle (BD PEN NEEDLE VIBHA U/F) 32G X 4 MM MISC 100 each 2     Sig: USE AS DIRECTED FOUR TIMES DAILY    lisinopril (PRINIVIL;ZESTRIL) 5 MG tablet 30 tablet 2     Sig: Take 1 tablet by mouth daily    atorvastatin (LIPITOR) 20 MG tablet 30 tablet 2     Sig: Take 1 tablet by mouth daily    Alcohol Swabs 70 % PADS 120 each 2     Sig: Apply 1 Units topically 4 times daily    ibuprofen (ADVIL;MOTRIN) 600 MG tablet 30 tablet 2     Sig: Take 1 tablet by mouth 4 times daily as needed for Pain       Last Filled:      Patient Phone Number: [phone] (home)     Last appt: 2021   Next appt: 3/17/2022

## 2022-02-15 RX ORDER — IBUPROFEN 600 MG/1
600 TABLET ORAL 4 TIMES DAILY PRN
Qty: 30 TABLET | Refills: 1 | Status: SHIPPED | OUTPATIENT
Start: 2022-02-15 | End: 2022-03-17 | Stop reason: SDUPTHER

## 2022-02-15 RX ORDER — PEN NEEDLE, DIABETIC 32GX 5/32"
NEEDLE, DISPOSABLE MISCELLANEOUS
Qty: 100 EACH | Refills: 1 | Status: SHIPPED | OUTPATIENT
Start: 2022-02-15 | End: 2022-03-17 | Stop reason: SDUPTHER

## 2022-02-15 RX ORDER — ATORVASTATIN CALCIUM 20 MG/1
20 TABLET, FILM COATED ORAL DAILY
Qty: 30 TABLET | Refills: 1 | Status: SHIPPED | OUTPATIENT
Start: 2022-02-15 | End: 2022-03-17 | Stop reason: SDUPTHER

## 2022-02-15 RX ORDER — INSULIN GLARGINE 100 [IU]/ML
INJECTION, SOLUTION SUBCUTANEOUS
Qty: 15 ML | Refills: 1 | Status: SHIPPED | OUTPATIENT
Start: 2022-02-15 | End: 2022-03-17 | Stop reason: SDUPTHER

## 2022-02-15 RX ORDER — LISINOPRIL 5 MG/1
5 TABLET ORAL DAILY
Qty: 30 TABLET | Refills: 1 | Status: SHIPPED | OUTPATIENT
Start: 2022-02-15 | End: 2022-03-17 | Stop reason: SDUPTHER

## 2022-02-15 RX ORDER — GLUCOSAMINE HCL/CHONDROITIN SU 500-400 MG
1 CAPSULE ORAL 4 TIMES DAILY
Qty: 120 EACH | Refills: 1 | Status: SHIPPED | OUTPATIENT
Start: 2022-02-15 | End: 2022-03-17 | Stop reason: SDUPTHER

## 2022-02-15 RX ORDER — INSULIN LISPRO 100 [IU]/ML
INJECTION, SOLUTION INTRAVENOUS; SUBCUTANEOUS
Qty: 15 ML | Refills: 1 | Status: SHIPPED | OUTPATIENT
Start: 2022-02-15 | End: 2022-03-17 | Stop reason: SDUPTHER

## 2022-03-17 ENCOUNTER — OFFICE VISIT (OUTPATIENT)
Dept: FAMILY MEDICINE CLINIC | Age: 47
End: 2022-03-17
Payer: MEDICAID

## 2022-03-17 VITALS
WEIGHT: 225 LBS | DIASTOLIC BLOOD PRESSURE: 65 MMHG | OXYGEN SATURATION: 98 % | SYSTOLIC BLOOD PRESSURE: 130 MMHG | HEART RATE: 53 BPM | HEIGHT: 71 IN | BODY MASS INDEX: 31.5 KG/M2

## 2022-03-17 DIAGNOSIS — I10 PRIMARY HYPERTENSION: Primary | ICD-10-CM

## 2022-03-17 DIAGNOSIS — Z79.4 TYPE 2 DIABETES MELLITUS WITH DIABETIC NEUROPATHY, WITH LONG-TERM CURRENT USE OF INSULIN (HCC): ICD-10-CM

## 2022-03-17 DIAGNOSIS — E78.49 OTHER HYPERLIPIDEMIA: ICD-10-CM

## 2022-03-17 DIAGNOSIS — E11.40 TYPE 2 DIABETES MELLITUS WITH DIABETIC NEUROPATHY, WITH LONG-TERM CURRENT USE OF INSULIN (HCC): ICD-10-CM

## 2022-03-17 LAB
FOLATE: 6.48 NG/ML (ref 4.78–24.2)
TSH REFLEX: 0.77 UIU/ML (ref 0.27–4.2)
VITAMIN B-12: 197 PG/ML (ref 211–911)

## 2022-03-17 PROCEDURE — G8484 FLU IMMUNIZE NO ADMIN: HCPCS | Performed by: FAMILY MEDICINE

## 2022-03-17 PROCEDURE — 2022F DILAT RTA XM EVC RTNOPTHY: CPT | Performed by: FAMILY MEDICINE

## 2022-03-17 PROCEDURE — 90670 PCV13 VACCINE IM: CPT | Performed by: FAMILY MEDICINE

## 2022-03-17 PROCEDURE — 36415 COLL VENOUS BLD VENIPUNCTURE: CPT | Performed by: FAMILY MEDICINE

## 2022-03-17 PROCEDURE — 1036F TOBACCO NON-USER: CPT | Performed by: FAMILY MEDICINE

## 2022-03-17 PROCEDURE — 90471 IMMUNIZATION ADMIN: CPT | Performed by: FAMILY MEDICINE

## 2022-03-17 PROCEDURE — 99214 OFFICE O/P EST MOD 30 MIN: CPT | Performed by: FAMILY MEDICINE

## 2022-03-17 PROCEDURE — G8417 CALC BMI ABV UP PARAM F/U: HCPCS | Performed by: FAMILY MEDICINE

## 2022-03-17 PROCEDURE — 3046F HEMOGLOBIN A1C LEVEL >9.0%: CPT | Performed by: FAMILY MEDICINE

## 2022-03-17 PROCEDURE — G8427 DOCREV CUR MEDS BY ELIG CLIN: HCPCS | Performed by: FAMILY MEDICINE

## 2022-03-17 RX ORDER — IBUPROFEN 600 MG/1
600 TABLET ORAL 4 TIMES DAILY PRN
Qty: 30 TABLET | Refills: 1 | Status: SHIPPED | OUTPATIENT
Start: 2022-03-17 | End: 2022-07-26 | Stop reason: ALTCHOICE

## 2022-03-17 RX ORDER — PEN NEEDLE, DIABETIC 32GX 5/32"
NEEDLE, DISPOSABLE MISCELLANEOUS
Qty: 100 EACH | Refills: 1 | Status: SHIPPED | OUTPATIENT
Start: 2022-03-17 | End: 2022-08-15

## 2022-03-17 RX ORDER — BLOOD SUGAR DIAGNOSTIC
STRIP MISCELLANEOUS
Qty: 400 STRIP | Refills: 5 | Status: SHIPPED | OUTPATIENT
Start: 2022-03-17

## 2022-03-17 RX ORDER — INSULIN LISPRO 100 [IU]/ML
30 INJECTION, SOLUTION INTRAVENOUS; SUBCUTANEOUS
Qty: 3 PEN | Refills: 1 | Status: SHIPPED | OUTPATIENT
Start: 2022-03-17

## 2022-03-17 RX ORDER — ASPIRIN 81 MG/1
81 TABLET ORAL DAILY
Qty: 90 TABLET | Refills: 1 | Status: SHIPPED | OUTPATIENT
Start: 2022-03-17

## 2022-03-17 RX ORDER — BACLOFEN 10 MG/1
TABLET ORAL
Qty: 180 TABLET | Refills: 1 | Status: SHIPPED | OUTPATIENT
Start: 2022-03-17

## 2022-03-17 RX ORDER — CHLORHEXIDINE GLUCONATE 213 G/1000ML
SOLUTION TOPICAL
Qty: 236 ML | Refills: 0 | Status: SHIPPED | OUTPATIENT
Start: 2022-03-17 | End: 2022-03-31

## 2022-03-17 RX ORDER — BLOOD-GLUCOSE METER
1 EACH MISCELLANEOUS DAILY
Qty: 1 KIT | Refills: 0 | Status: SHIPPED | OUTPATIENT
Start: 2022-03-17

## 2022-03-17 RX ORDER — INSULIN GLARGINE 100 [IU]/ML
30 INJECTION, SOLUTION SUBCUTANEOUS 2 TIMES DAILY
Qty: 3 PEN | Refills: 1 | Status: SHIPPED | OUTPATIENT
Start: 2022-03-17 | End: 2022-03-25

## 2022-03-17 RX ORDER — LISINOPRIL 5 MG/1
5 TABLET ORAL DAILY
Qty: 90 TABLET | Refills: 1 | Status: SHIPPED | OUTPATIENT
Start: 2022-03-17 | End: 2022-09-13

## 2022-03-17 RX ORDER — ATORVASTATIN CALCIUM 20 MG/1
20 TABLET, FILM COATED ORAL DAILY
Qty: 90 TABLET | Refills: 1 | Status: SHIPPED | OUTPATIENT
Start: 2022-03-17 | End: 2022-09-13

## 2022-03-17 RX ORDER — GLUCOSAMINE HCL/CHONDROITIN SU 500-400 MG
1 CAPSULE ORAL 4 TIMES DAILY
Qty: 120 EACH | Refills: 3 | Status: SHIPPED | OUTPATIENT
Start: 2022-03-17

## 2022-03-17 RX ORDER — LANCETS 28 GAUGE
100 EACH MISCELLANEOUS 3 TIMES DAILY
Qty: 100 EACH | Refills: 6 | Status: SHIPPED | OUTPATIENT
Start: 2022-03-17

## 2022-03-17 RX ORDER — KETOROLAC TROMETHAMINE 10 MG/1
10 TABLET, FILM COATED ORAL EVERY 6 HOURS PRN
Qty: 20 TABLET | Refills: 0 | Status: SHIPPED | OUTPATIENT
Start: 2022-03-17

## 2022-03-17 RX ORDER — OMEPRAZOLE 40 MG/1
40 CAPSULE, DELAYED RELEASE ORAL DAILY
Qty: 90 CAPSULE | Refills: 1 | Status: SHIPPED | OUTPATIENT
Start: 2022-03-17 | End: 2022-09-13

## 2022-03-17 NOTE — PROGRESS NOTES
Per Dr. Nathan mckeon to order and administer:  Immunizations Administered     Name Date Dose Route    Pneumococcal Conjugate 13-valent ([id number]) 3/17/2022 0.5 mL Intramuscular    Site: Deltoid- Left    Lot: [id number]    NDC: 1201-2466-70

## 2022-03-17 NOTE — PATIENT INSTRUCTIONS
Patient Education        Pneumococcal Conjugate Vaccine (PCV13): What You Need to Know  Why get vaccinated? Pneumococcal conjugate vaccine (PCV13) can prevent pneumococcal disease. Pneumococcal disease refers to any illness caused by pneumococcal bacteria. These bacteria can cause many types of illnesses, including pneumonia, which is an infection of the lungs. Pneumococcal bacteria are one of the most common causes of pneumonia. Besides pneumonia, pneumococcal bacteria can also cause:  · Ear infections  · Sinus infections  · Meningitis (infection of the tissue covering the brain and spinal cord)  · Bacteremia (infection of the blood)  Anyone can get pneumococcal disease, but children under 3years old, people with certain medical conditions, adults 72 years or older, and cigarette smokers are at the highest risk. Most pneumococcal infections are mild. However, some can result in long-term problems, such as brain damage or hearing loss. Meningitis, bacteremia, and pneumonia caused by pneumococcal disease can be fatal.  PCV13  PCV13 protects against 13 types of bacteria that cause pneumococcal disease. Infants and young children usually need 4 doses of pneumococcal conjugate vaccine, at ages 3, 3, 10, and 12-15 months. Older children (through age 62 months) may be vaccinated if they did not receive the recommended doses. A dose of PCV13 is also recommended for adults and children 6 years or older with certain medical conditions if they did not already receive PCV13. This vaccine may be given to healthy adults 72 years or older who did not already receive PCV13, based on discussions between the patient and health care provider.   Talk with your health care provider  Tell your vaccination provider if the person getting the vaccine:  · Has had an allergic reaction after a previous dose of PCV13, to an earlier pneumococcal conjugate vaccine known as PCV7, or to any vaccine containing diphtheria toxoid (for example, DTaP), or has any severe, life-threatening allergies  In some cases, your health care provider may decide to postpone PCV13 vaccination until a future visit. People with minor illnesses, such as a cold, may be vaccinated. People who are moderately or severely ill should usually wait until they recover before getting PCV13. Your health care provider can give you more information. Risks of a vaccine reaction  · Redness, swelling, pain, or tenderness where the shot is given, and fever, loss of appetite, fussiness (irritability), feeling tired, headache, and chills can happen after PCV13 vaccination. Evalene Males children may be at increased risk for seizures caused by fever after PCV13 if it is administered at the same time as inactivated influenza vaccine. Ask your health care provider for more information. People sometimes faint after medical procedures, including vaccination. Tell your provider if you feel dizzy or have vision changes or ringing in the ears. As with any medicine, there is a very remote chance of a vaccine causing a severe allergic reaction, other serious injury, or death. What if there is a serious problem? An allergic reaction could occur after the vaccinated person leaves the clinic. If you see signs of a severe allergic reaction (hives, swelling of the face and throat, difficulty breathing, a fast heartbeat, dizziness, or weakness), call 9-1-1 and get the person to the nearest hospital.  For other signs that concern you, call your health care provider. Adverse reactions should be reported to the Vaccine Adverse Event Reporting System (VAERS). Your health care provider will usually file this report, or you can do it yourself. Visit the VAERS website at www.vaers. hhs.gov or call 8-539.685.4834. VAERS is only for reporting reactions, and VAERS staff members do not give medical advice.   The [place] Vaccine Injury Compensation Program  The [place] Vaccine Injury Compensation Program (VICP) is a federal program that was created to compensate people who may have been injured by certain vaccines. Claims regarding alleged injury or death due to vaccination have a time limit for filing, which may be as short as two years. Visit the VICP website at www.[facility].gov/vaccinecompensation or call 7-831.991.3031 to learn about the program and about filing a claim. How can I learn more? · Ask your health care provider. · Call your local or state health department. · Visit the website of the Food and Drug Administration (FDA) for vaccine package inserts and additional information at www.fda.gov/vaccines-blood-biologics/vaccines. · Contact the Centers for Disease Control and Prevention (CDC):  ? Call 1-982.226.4606 (1-800-CDC-INFO) or  ? Visit CDC's website at www.cdc.gov/vaccines. Vaccine Information Statement  PCV13  8/6/2021  43 NESHA Homar Ortizsamm [id number]  [facility] Disease Control and [facility]  Many vaccine information statements are available in [place] and other languages. See www.immunize.org/vis  Hojas de información sobre vacunas están disponibles en español y en muchos otros idiomas. Visite www.immunize.org/vis  Care instructions adapted under license by [facility] ([facility]). If you have questions about a medical condition or this instruction, always ask your healthcare professional. [place] [address] any warranty or liability for your use of this information.

## 2022-03-17 NOTE — PROGRESS NOTES
A/P:    Diagnosis Orders   1. Primary hypertension     2. Type 2 diabetes mellitus with diabetic neuropathy, with long-term current use of insulin ([facility])  FreeStyle Lancets MISC    blood glucose test strips (ONETOUCH ULTRA) strip    Hemoglobin A1C    TSH with Reflex    Vitamin B12 & Folate   3. Other hyperlipidemia         His diabetes was borderline controlled on last check, will check an A1c today    With his longstanding neuropathic symptoms, will check B12, folate, TSH levels today    His hyperlipidemia was well controlled on last check    He would like to proceed with Prevnar 13 vaccine today    Annual eye exam encouraged    Daily foot checks encouraged    Hibiclens prescribed for times when he feels folliculitis is flaring    Follow-up in 3 months or sooner if needed    O: /65   Pulse 53   Ht 5' 10.5\" (1.791 m)   Wt 225 lb (102.1 kg)   SpO2 98%   BMI 31.83 kg/m²    Gen- NAD, pleasant  HEENT- Eyes without icterus or injection  Neck- Supple, no lymphadenopathy appreciated  Lungs- CTAB  Heart- RRR  Abd- Soft, non tender  Ext- No edema  Psych- Appropriate    S: CC-checkup, establish care  HPI-patient presents to establish care with new clinic provider and for follow-up of diabetes, hypertension, hyperlipidemia. He continues with stable neuropathic pain of legs. He would like something to have on hand for when he has widespread folliculitis on his back that flares during summer. He denies chest pain, dyspnea, palpitations, dizziness, syncope. He reports he had an event recorder that was recently been removed by [facility].  He has no concerns today.      ROS- Per HPI    Patient's medications, allergies, and past medical hx were reviewed

## 2022-03-18 LAB
ESTIMATED AVERAGE GLUCOSE: 211.6 MG/DL
HBA1C MFR BLD: 9 %

## 2022-03-25 DIAGNOSIS — E53.8 B12 DEFICIENCY: ICD-10-CM

## 2022-03-25 RX ORDER — LANOLIN ALCOHOL/MO/W.PET/CERES
2000 CREAM (GRAM) TOPICAL DAILY
Qty: 200 TABLET | Refills: 3 | Status: SHIPPED | OUTPATIENT
Start: 2022-03-25 | End: 2022-06-27 | Stop reason: SDUPTHER

## 2022-03-25 RX ORDER — INSULIN GLARGINE 100 [IU]/ML
35 INJECTION, SOLUTION SUBCUTANEOUS 2 TIMES DAILY
Qty: 3 PEN | Refills: 1 | Status: SHIPPED | OUTPATIENT
Start: 2022-03-25 | End: 2022-08-15

## 2022-03-29 ENCOUNTER — TELEPHONE (OUTPATIENT)
Dept: FAMILY MEDICINE CLINIC | Age: 47
End: 2022-03-29

## 2022-03-29 NOTE — TELEPHONE ENCOUNTER
Please call patient to make sure he has MyChart lab result recommendations. Recommendations are not marked as reviewed by patient.

## 2022-05-10 ENCOUNTER — TELEPHONE (OUTPATIENT)
Dept: FAMILY MEDICINE CLINIC | Age: 47
End: 2022-05-10

## 2022-05-10 NOTE — TELEPHONE ENCOUNTER
Was out of work due to stomach virus. Work is requesting an excuse. He is requesting a work note to excuse him from for April 15-16 and for April 22-23.    Pt is reachable at [phone]

## 2022-05-11 NOTE — TELEPHONE ENCOUNTER
Letter in, please let pt know, please have him call me to let me know his symptoms in the future as well, thank you

## 2022-06-22 NOTE — TELEPHONE ENCOUNTER
Medication:   Requested Prescriptions     Pending Prescriptions Disp Refills    TRULICITY 1.5 XC/2.1FE SOPN [Pharmacy Med Name: Abbie Hurst 9.5PZ/8.8SL SDP 0.5ML] 6 mL      Sig: ADMINISTER 1.5 MG UNDER THE SKIN 1 TIME A WEEK        Last Filled:      Patient Phone Number: [phone] (home)     Last appt: 3/17/2022   Next appt: 6/27/2022    Last OARRS: No flowsheet data found.

## 2022-06-23 RX ORDER — DULAGLUTIDE 1.5 MG/.5ML
INJECTION, SOLUTION SUBCUTANEOUS
Qty: 6 ML | Refills: 11 | Status: SHIPPED | OUTPATIENT
Start: 2022-06-23 | End: 2022-06-27 | Stop reason: SDUPTHER

## 2022-06-27 ENCOUNTER — OFFICE VISIT (OUTPATIENT)
Dept: FAMILY MEDICINE CLINIC | Age: 47
End: 2022-06-27
Payer: MEDICAID

## 2022-06-27 VITALS
BODY MASS INDEX: 30.66 KG/M2 | HEART RATE: 66 BPM | SYSTOLIC BLOOD PRESSURE: 129 MMHG | DIASTOLIC BLOOD PRESSURE: 80 MMHG | OXYGEN SATURATION: 96 % | WEIGHT: 219 LBS | HEIGHT: 71 IN | TEMPERATURE: 97.9 F

## 2022-06-27 DIAGNOSIS — M25.512 CHRONIC LEFT SHOULDER PAIN: ICD-10-CM

## 2022-06-27 DIAGNOSIS — E11.65 TYPE II DIABETES MELLITUS WITH HYPEROSMOLARITY, UNCONTROLLED (HCC): Primary | ICD-10-CM

## 2022-06-27 DIAGNOSIS — G89.29 CHRONIC LEFT SHOULDER PAIN: ICD-10-CM

## 2022-06-27 DIAGNOSIS — K21.9 GASTROESOPHAGEAL REFLUX DISEASE, UNSPECIFIED WHETHER ESOPHAGITIS PRESENT: ICD-10-CM

## 2022-06-27 DIAGNOSIS — E11.00 TYPE II DIABETES MELLITUS WITH HYPEROSMOLARITY, UNCONTROLLED (HCC): Primary | ICD-10-CM

## 2022-06-27 DIAGNOSIS — M20.001 FINGER DEFORMITY, ACQUIRED, RIGHT: ICD-10-CM

## 2022-06-27 LAB
A/G RATIO: 1.6 (ref 1.1–2.2)
ALBUMIN SERPL-MCNC: 3.6 G/DL (ref 3.4–5)
ALP BLD-CCNC: 80 U/L (ref 40–129)
ALT SERPL-CCNC: 11 U/L (ref 10–40)
ANION GAP SERPL CALCULATED.3IONS-SCNC: 12 MMOL/L (ref 3–16)
AST SERPL-CCNC: 11 U/L (ref 15–37)
BACTERIA: NORMAL /HPF
BASOPHILS ABSOLUTE: 0 K/UL (ref 0–0.2)
BASOPHILS RELATIVE PERCENT: 0.4 %
BILIRUB SERPL-MCNC: 0.6 MG/DL (ref 0–1)
BILIRUBIN URINE: NEGATIVE
BLOOD, URINE: NEGATIVE
BUN BLDV-MCNC: 6 MG/DL (ref 7–20)
CALCIUM SERPL-MCNC: 8.8 MG/DL (ref 8.3–10.6)
CHLORIDE BLD-SCNC: 106 MMOL/L (ref 99–110)
CLARITY: ABNORMAL
CO2: 25 MMOL/L (ref 21–32)
COLOR: YELLOW
CREAT SERPL-MCNC: 1 MG/DL (ref 0.9–1.3)
CREATININE URINE: 304.9 MG/DL (ref 39–259)
EOSINOPHILS ABSOLUTE: 0.1 K/UL (ref 0–0.6)
EOSINOPHILS RELATIVE PERCENT: 1.9 %
EPITHELIAL CELLS, UA: 0 /HPF (ref 0–5)
GFR AFRICAN AMERICAN: >60
GFR NON-AFRICAN AMERICAN: >60
GLUCOSE BLD-MCNC: 114 MG/DL (ref 70–99)
GLUCOSE URINE: NEGATIVE MG/DL
HCT VFR BLD CALC: 38.4 % (ref 40.5–52.5)
HEMOGLOBIN: 12.5 G/DL (ref 13.5–17.5)
HYALINE CASTS: 0 /LPF (ref 0–8)
KETONES, URINE: ABNORMAL MG/DL
LEUKOCYTE ESTERASE, URINE: NEGATIVE
LYMPHOCYTES ABSOLUTE: 1.7 K/UL (ref 1–5.1)
LYMPHOCYTES RELATIVE PERCENT: 23.3 %
MCH RBC QN AUTO: 32.7 PG (ref 26–34)
MCHC RBC AUTO-ENTMCNC: 32.7 G/DL (ref 31–36)
MCV RBC AUTO: 100.1 FL (ref 80–100)
MICROALBUMIN UR-MCNC: <1.2 MG/DL
MICROALBUMIN/CREAT UR-RTO: ABNORMAL MG/G (ref 0–30)
MICROSCOPIC EXAMINATION: YES
MONOCYTES ABSOLUTE: 0.6 K/UL (ref 0–1.3)
MONOCYTES RELATIVE PERCENT: 8 %
NEUTROPHILS ABSOLUTE: 4.9 K/UL (ref 1.7–7.7)
NEUTROPHILS RELATIVE PERCENT: 66.4 %
NITRITE, URINE: NEGATIVE
PDW BLD-RTO: 13.2 % (ref 12.4–15.4)
PH UA: 5.5 (ref 5–8)
PLATELET # BLD: 191 K/UL (ref 135–450)
PMV BLD AUTO: 10.1 FL (ref 5–10.5)
POTASSIUM SERPL-SCNC: 4.1 MMOL/L (ref 3.5–5.1)
PROTEIN UA: NEGATIVE MG/DL
RBC # BLD: 3.83 M/UL (ref 4.2–5.9)
RBC UA: 0 /HPF (ref 0–4)
SODIUM BLD-SCNC: 143 MMOL/L (ref 136–145)
SPECIFIC GRAVITY UA: 1.02 (ref 1–1.03)
TOTAL PROTEIN: 5.8 G/DL (ref 6.4–8.2)
URINE TYPE: ABNORMAL
UROBILINOGEN, URINE: 1 E.U./DL
WBC # BLD: 7.4 K/UL (ref 4–11)
WBC UA: 0 /HPF (ref 0–5)

## 2022-06-27 PROCEDURE — G8427 DOCREV CUR MEDS BY ELIG CLIN: HCPCS | Performed by: FAMILY MEDICINE

## 2022-06-27 PROCEDURE — 36415 COLL VENOUS BLD VENIPUNCTURE: CPT | Performed by: FAMILY MEDICINE

## 2022-06-27 PROCEDURE — 2022F DILAT RTA XM EVC RTNOPTHY: CPT | Performed by: FAMILY MEDICINE

## 2022-06-27 PROCEDURE — 81003 URINALYSIS AUTO W/O SCOPE: CPT | Performed by: FAMILY MEDICINE

## 2022-06-27 PROCEDURE — 3044F HG A1C LEVEL LT 7.0%: CPT | Performed by: FAMILY MEDICINE

## 2022-06-27 PROCEDURE — G8417 CALC BMI ABV UP PARAM F/U: HCPCS | Performed by: FAMILY MEDICINE

## 2022-06-27 PROCEDURE — 99214 OFFICE O/P EST MOD 30 MIN: CPT | Performed by: FAMILY MEDICINE

## 2022-06-27 PROCEDURE — 1036F TOBACCO NON-USER: CPT | Performed by: FAMILY MEDICINE

## 2022-06-27 RX ORDER — LANOLIN ALCOHOL/MO/W.PET/CERES
2000 CREAM (GRAM) TOPICAL DAILY
Qty: 200 TABLET | Refills: 3 | Status: SHIPPED | OUTPATIENT
Start: 2022-06-27

## 2022-06-27 RX ORDER — DULAGLUTIDE 1.5 MG/.5ML
INJECTION, SOLUTION SUBCUTANEOUS
Qty: 18 ML | Refills: 3 | Status: SHIPPED | OUTPATIENT
Start: 2022-06-27

## 2022-06-27 RX ORDER — CYCLOBENZAPRINE HCL 5 MG
5 TABLET ORAL 3 TIMES DAILY PRN
Qty: 90 TABLET | Refills: 0 | Status: SHIPPED | OUTPATIENT
Start: 2022-06-27 | End: 2022-07-27

## 2022-06-27 ASSESSMENT — PATIENT HEALTH QUESTIONNAIRE - PHQ9
1. LITTLE INTEREST OR PLEASURE IN DOING THINGS: 0
SUM OF ALL RESPONSES TO PHQ9 QUESTIONS 1 & 2: 0
SUM OF ALL RESPONSES TO PHQ QUESTIONS 1-9: 0
2. FEELING DOWN, DEPRESSED OR HOPELESS: 0
SUM OF ALL RESPONSES TO PHQ QUESTIONS 1-9: 0

## 2022-06-27 NOTE — PROGRESS NOTES
A/P:    Diagnosis Orders   1. Type II diabetes mellitus with hyperosmolarity, uncontrolled ([facility])  CBC with Auto Differential    Comprehensive Metabolic Panel    Hemoglobin A1C    Urinalysis    MICROALBUMIN / CREATININE URINE RATIO   2. Gastroesophageal reflux disease, unspecified whether esophagitis present     3. Chronic left shoulder pain  Rosendo Archer MD, Orthopedic Surgery (Primary Care Sports Medicine), [facility]   4. Finger deformity, acquired, right  Rosendo Archer MD, Orthopedic Surgery (Primary Care Sports Medicine), [facility]       His diabetes was a bit uncontrolled on last check. He we will continue current meds with lifestyle optimization. We will get A1c and above urine studies. He was encouraged to schedule his yearly eye exam.  The importance of checking his feet emphasized. The complications of untreated B12 deficiency were discussed in detail. He agrees to start the B12. We will plan to recheck level at his next appointment in 3 months. If level is not a solid normal, we will plan for B12 injections. His GERD symptoms are controlled. He will continue PPI. For his shoulder pain and finger deformity, have placed referral to orthopedics. He also would like refill of cyclobenzaprine which has helped his shoulder pain in the past.  I have refilled this medicine. Follow-up in 3 months or sooner if needed    O: /80   Pulse 66   Temp 97.9 °F (36.6 °C)   Ht 5' 10.5\" (1.791 m)   Wt 219 lb (99.3 kg)   SpO2 96%   BMI 30.98 kg/m²    Gen- NAD, pleasant  HEENT- Eyes without icterus or injection  Neck- Supple, no lymphadenopathy appreciated  Lungs- CTAB  Heart- RRR  Abd- Soft, non tender  Ext- No edema, left shoulder with muscle hypertonicity, some restriction in ROM, right fourth finger PIP joint flexion deformity  Psych- Appropriate    S: CC-checkup  HPI-patient presents for follow-up of diabetes, GERD, B12 deficiency.   He has not started B12 yet; he forgot to start it. He reports his GERD symptoms are controlled on PPI. He denies chest pain, dyspnea, palpitations. He would like to get back in Ortho for his chronic left shoulder pain that is exacerbated with videogame playing. He reports that steroid injection gave him about 7 months of relief in the past.  He also has an acquired right fourth finger PIP joint deformity. There is no pain associated. However, it does affect his finger use.     ROS- Per HPI    Patient's medications, allergies, and past medical hx were reviewed

## 2022-06-28 ENCOUNTER — TELEPHONE (OUTPATIENT)
Dept: FAMILY MEDICINE CLINIC | Age: 47
End: 2022-06-28

## 2022-06-28 LAB
ESTIMATED AVERAGE GLUCOSE: 131.2 MG/DL
HBA1C MFR BLD: 6.2 %

## 2022-06-28 NOTE — TELEPHONE ENCOUNTER
For documentation    Pt was in the office for an appt. appt was completed at 355. He called [employer] for  [phone]. At 5 when I was leaving pt was still here trying to get a ride. States that he has been told 15 mins a few times. At 510 pt came into the office and stated that his ride was finally here.

## 2022-07-19 ENCOUNTER — OFFICE VISIT (OUTPATIENT)
Dept: ORTHOPEDIC SURGERY | Age: 47
End: 2022-07-19
Payer: MEDICAID

## 2022-07-19 VITALS — WEIGHT: 221 LBS | HEIGHT: 71 IN | BODY MASS INDEX: 30.94 KG/M2

## 2022-07-19 DIAGNOSIS — M79.641 RIGHT HAND PAIN: ICD-10-CM

## 2022-07-19 DIAGNOSIS — M79.644 FINGER PAIN, RIGHT: Primary | ICD-10-CM

## 2022-07-19 DIAGNOSIS — R20.0 NUMBNESS OF RIGHT HAND: ICD-10-CM

## 2022-07-19 DIAGNOSIS — M15.2 OSTEOARTHRITIS OF PROXIMAL INTERPHALANGEAL (PIP) JOINT OF RIGHT RING FINGER: ICD-10-CM

## 2022-07-19 PROCEDURE — 99243 OFF/OP CNSLTJ NEW/EST LOW 30: CPT | Performed by: FAMILY MEDICINE

## 2022-07-19 PROCEDURE — L3908 WHO COCK-UP NONMOLDE PRE OTS: HCPCS | Performed by: FAMILY MEDICINE

## 2022-07-19 PROCEDURE — G8417 CALC BMI ABV UP PARAM F/U: HCPCS | Performed by: FAMILY MEDICINE

## 2022-07-19 PROCEDURE — G8427 DOCREV CUR MEDS BY ELIG CLIN: HCPCS | Performed by: FAMILY MEDICINE

## 2022-07-19 RX ORDER — ETODOLAC 400 MG/1
400 TABLET, FILM COATED ORAL 2 TIMES DAILY
Qty: 60 TABLET | Refills: 3 | Status: SHIPPED | OUTPATIENT
Start: 2022-07-19 | End: 2023-07-19

## 2022-07-19 RX ORDER — METHYLPREDNISOLONE 4 MG/1
TABLET ORAL
Qty: 21 KIT | Refills: 0 | Status: SHIPPED | OUTPATIENT
Start: 2022-07-19

## 2022-07-19 NOTE — PROGRESS NOTES
Chief Complaint    Hand Pain (R ring finger)    Evaluation ongoing right ring finger PIP joint pain with extension loss and occasional hand numbness and tingling    History of Present Illness:  Maricel Page is a 40 y.o. male who is a right-hand-dominant black male who works in the [employer] in [facility] who is being seen today in kind consultation from Dr. Lambert Carpenter for evaluation of ongoing pain with extension loss to his right ring finger. This is been bothering him for over 2 years now and he did have a couple of visits with Dr. Jodi Henderson who initially treated him with an injection for trigger finger and instructed him on home-based exercises. There is no improvement following his trigger injection but only rarely actively locks or triggers but has noticed extension deficits in the range of 20 degrees at the PIP joint with soreness but also has noted some increase in numbness and tingling to his hand to the point where he is occasionally shake his hand weak and during the day. He has not really noted focal motor deficits but is never been worked up with EMG for occult carpal tunnel. Denies neck pain or radicular symptoms. He has continued to work and occasionally does utilize anti-inflammatories but not consistently. No active locking or triggering recently but does have a fairly consistent throbbing pain at 3 to 4-10 with sharper pain at 8 out of 10 with active use and states that his finger due to lack of extension tends to get in the way of his activities. He is being seen today for updated x-rays and treatment recommendations after seeing Dr. Lambert Carpenter in the office on 6/29/2022. Sherryle Moder     Pain Assessment  Location of Pain:  (ring finger)  Location Modifiers: Right  Severity of Pain: 8  Quality of Pain: Throbbing, Sharp, Dull, Aching, Locking  Duration of Pain: Persistent  Frequency of Pain: Constant  Relieving Factors: Ice, Exercise  Result of Injury: No  Work-Related Injury: No  Are there other pain locations you wish to document?: No      Medical History  Patient's medications, allergies, past medical, surgical, social and family histories were reviewed and updated as appropriate. Review of Systems  Pertinent items are noted in HPI  Review of systems reviewed from Patient History Form dated on 7/19/2022 and available in the patient's chart under the Media tab. Vital Signs  There were no vitals filed for this visit. General Exam:     Constitutional: Patient is adequately groomed with no evidence of malnutrition  DTRs: Deep tendon reflexes are intact  Mental Status: The patient is oriented to time, place and person. The patient's mood and affect are appropriate. Lymphatic: The lymphatic examination bilaterally reveals all areas to be without enlargement or induration. Vascular: Examination reveals no swelling or calf tenderness. Peripheral pulses are palpable and 2+. Neurological: The patient has good coordination. There is no weakness or sensory deficit. Hand Examination    Inspection: He does have an obvious extension deficit primarily to the PIP joint of his right ring finger. There does not appear to be high-grade Dupuytren's but is quite stiff with attempted passive extension particular to the fourth finger. There does not appear to be high-grade atrophy to the thenar eminence. Palpation: He does have tenderness to the collateral ligaments of the PIP joint and once again does have an extensor lag at the PIP joint of about 20 degrees. He does have reasonable flexion without scissoring or rotational defect. He has only mild tenderness without active triggering over the A1 pulley currently. Rang of Motion: Extension loss of PIP joint is noted    Strength: Flexor and extensor tendon function appears to be intact. Special Tests:  There is no evidence of current laxity although PIP grind testing does reproduce a portion of his pain involving the fourth finger. Equivocal Tinel's and Phalen's to the cubital and carpal tunnel currently. Skin: There are no rashes, ulcerations or lesions. Distal motor sensory and vascular exam is intact. Gait: Fluid smooth gait    Reflex symmetrically preserved    Additional Comments:     Additional Examinations:  Contralateral Exam: Contralateral left hand exam is benign. Right Upper Extremity:  Examination of the right upper extremity does not show any tenderness, deformity or injury. Range of motion is unremarkable. There is no gross instability. There are no rashes, ulcerations or lesions. Strength and tone are normal.  Left Upper Extremity: Examination of the left upper extremity does not show any tenderness, deformity or injury. Range of motion is unremarkable. There is no gross instability. There are no rashes, ulcerations or lesions. Strength and tone are normal.      Diagnostic Test Findings: Right hand AP lateral bleak films were obtained today and does not show evidence of obvious acute osseous injury, he may have some mild degenerative changes over the PIP joint of the right ring finger. No IP subluxation. Assessment : #1. Chronic right ring finger pain with PIP extension loss with PIP osteoarthritis and persistent episodic right hand numbness and tingling with occasional weakness    Impression:  Encounter Diagnoses   Name Primary? 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     Numbness of right hand        Office Procedures:  Orders Placed This Encounter   Procedures    XR HAND RIGHT (MIN 3 VIEWS)     Standing Status:   Future     Number of Occurrences:   1     Standing Expiration Date:   7/19/2023    Amb External Referral To Occupational Therapy     Referral Priority:   Routine     Referral Type:   Consult for Advice and Opinion     Referral Reason:   Specialty Services Required     Referred to Provider:    Twila Andrade Scarlett Cabrera OT     Requested Specialty:   Occupational Therapy     Number of Visits Requested:   1    EMG     Standing Status:   Future     Standing Expiration Date:   7/19/2023     Scheduling Instructions:      Paige Saunders MD      Affiliated With: [facility], [facility], [facility]            [address], [address]      Phone: [phone] [address] [facility], [address] [place] [address], [facility] [address]      Phone: [phone]            Fax: [phone]            Please fax results to Dr Tivis Oppenheim at [phone]     Order Specific Question:   Which body part? Answer:   RUE R/O CARPAL TUNNEL    DJO Quick Fit Wrist     Patient was prescribed a DJO Quick Fit Wrist brace. The right wrist will require stabilization / immobilization from this semi-rigid / rigid orthosis to improve their function. The orthosis will assist in protecting the affected area, provide functional support and facilitate healing. The patient was educated and fit by a healthcare professional with expert knowledge and specialization in brace application while under the direct supervision of the treating physician. Verbal and written instructions for the use of and application of this item were provided. They were instructed to contact the office immediately should the brace result in increased pain, decreased sensation, increased swelling or worsening of the condition. Treatment Plan:  Treatment options were discussed withSerjio Anthony. We did review his current plain films and exam findings as well as notes from when he saw Dr. Avani Rangel for this in 2020. We did place him on a Medrol Dosepak and he will monitor for transient hyperglycemia although his last A1c was quite good at 6.2. We will also place him on Lodine 400 mg 1 pill twice daily we did set him up for a right upper extremity EMG to evaluate for an occult carpal tunnel.   He was given a cock-up splint and would like for him to see hand therapy to see if we can mobilize his PIP joint. We held off on IP joint injection pending the results of his testing and we will see him back in a few weeks post EMG. He will contact us in the interim with questions or concerns. CC: Dr. Gwen Mora      This dictation was performed with a verbal recognition program [facility] ) and it was checked for errors. It is possible that there are still dictated errors within this office note. If so, please bring any errors to my attention for an addendum. All efforts were made to ensure that this office note is accurate.

## 2022-07-19 NOTE — PATIENT INSTRUCTIONS
Take Medrol first for 6 days. This is a steroid pack. Flip the package over to the foil side and the directions will tell you to start with 6 pills the first day, 5 pills the second day, etc. Please do not take any other anti-inflammatories with the medrol dose richar as this can upset your stomach. If something else is needed, you may take extra strength tylenol.      Once you are finished with the medrol, then you may re-start or start your anti-inflammatory: LODINE (ETODOLAC) 2X/DAY WITH FOOD

## 2022-07-26 ENCOUNTER — TELEPHONE (OUTPATIENT)
Dept: ORTHOPEDIC SURGERY | Age: 47
End: 2022-07-26

## 2022-07-26 ENCOUNTER — OFFICE VISIT (OUTPATIENT)
Dept: ORTHOPEDIC SURGERY | Age: 47
End: 2022-07-26
Payer: MEDICAID

## 2022-07-26 VITALS — HEIGHT: 71 IN | BODY MASS INDEX: 30.94 KG/M2 | WEIGHT: 221 LBS

## 2022-07-26 DIAGNOSIS — M25.512 ACUTE PAIN OF LEFT SHOULDER: Primary | ICD-10-CM

## 2022-07-26 DIAGNOSIS — M75.82 TENDINITIS OF LEFT ROTATOR CUFF: ICD-10-CM

## 2022-07-26 DIAGNOSIS — M65.341 TRIGGER RING FINGER OF RIGHT HAND: ICD-10-CM

## 2022-07-26 DIAGNOSIS — M75.42 SHOULDER IMPINGEMENT, LEFT: ICD-10-CM

## 2022-07-26 DIAGNOSIS — M79.641 RIGHT HAND PAIN: ICD-10-CM

## 2022-07-26 DIAGNOSIS — G56.01 CARPAL TUNNEL SYNDROME, RIGHT: ICD-10-CM

## 2022-07-26 DIAGNOSIS — M15.2 OSTEOARTHRITIS OF PROXIMAL INTERPHALANGEAL (PIP) JOINT OF RIGHT RING FINGER: ICD-10-CM

## 2022-07-26 DIAGNOSIS — R20.0 NUMBNESS OF RIGHT HAND: ICD-10-CM

## 2022-07-26 PROBLEM — M25.812 SHOULDER IMPINGEMENT, LEFT: Status: ACTIVE | Noted: 2022-07-26

## 2022-07-26 PROCEDURE — 99214 OFFICE O/P EST MOD 30 MIN: CPT | Performed by: FAMILY MEDICINE

## 2022-07-26 PROCEDURE — G8428 CUR MEDS NOT DOCUMENT: HCPCS | Performed by: FAMILY MEDICINE

## 2022-07-26 PROCEDURE — G8417 CALC BMI ABV UP PARAM F/U: HCPCS | Performed by: FAMILY MEDICINE

## 2022-07-26 PROCEDURE — 1036F TOBACCO NON-USER: CPT | Performed by: FAMILY MEDICINE

## 2022-07-26 NOTE — PROGRESS NOTES
Chief Complaint    Shoulder Pain (OPNP LEFT SHOULDER)    Initial consultation recurrent symptomatic longstanding left shoulder pain with weakness and motion loss    History of Present Illness:  Herbert Sales is a 40 y.o. male who is a right-hand-dominant black male who works in the [employer] at [employer] in [facility] who is being seen today in kind consultation from Dr. Melisa Ceballos for evaluation of ongoing pain to his left shoulder. He was seen previously last week for right hand numbness and tingling with extension loss at the PIP joint and is set up for his upper extremity EMG to evaluate for carpal tunnel on 8/12/2022. He does present today for evaluation of chronic longstanding episodic left shoulder pain. He states he has a least a 10-year history of pain episodically to his shoulder where it does frequently flare. He does not recall any specific history of injury or no activity prior to becoming symptomatic although this will happen at least several times per year with his last episode occurring last week. He does recall getting an evaluation done at [facility] about 6 to 7 years ago where he did do physical therapy as well as a steroid injection with some improvement but has never had MRI imaging. He has having pain range between a 3-7 out of 10 with active elevation of feels stiff and heavy. No neck pain or radicular symptoms noted. He has had occasional crepitation and popping and is not consistent with home-based exercises. Denies neck pain or radicular symptoms. He is being seen today for orthopedic and sports consultation with initial left shoulder imaging.       Pain Assessment  Location of Pain: Shoulder  Location Modifiers: Left  Severity of Pain: 6  Quality of Pain: Throbbing  Duration of Pain: A few minutes  Frequency of Pain: Intermittent  Aggravating Factors: Bending, Stretching, Straightening  Limiting Behavior: Yes  Relieving Factors: Rest  Result of Injury: No  Work-Related Injury: No  Are there other pain locations you wish to document?: No    Medical History  Patient's medications, allergies, past medical, surgical, social and family histories were reviewed and updated as appropriate. Review of Systems  Relevant review of systems reviewed on 7/26/2022 and available in the patient's chart under the medial tab. Vital Signs  There were no vitals filed for this visit. General Exam:   Constitutional: Patient is adequately groomed with no evidence of malnutrition  DTRs: Deep tendon reflexes are intact  Mental Status: The patient is oriented to time, place and person. The patient's mood and affect are appropriate. Lymphatic: The lymphatic examination bilaterally reveals all areas to be without enlargement or induration. Vascular: Examination reveals no swelling or calf tenderness. Peripheral pulses are palpable and 2+. Neurological: The patient has good coordination. There is no weakness or sensory deficit. Shoulder Examination    Inspection: There is no high-grade deformity or substantial soft tissue swelling although he may have some very mild AC crepitation. No tense effusion. Palpation: He does have some tenderness over the greater tuberosity posterior cuff mildly biceps tendon and AC joint. Rang of Motion: He does have pain associate reductions in motion. Pain with abduction beyond 110 and forward flexion beyond 130. He is lacking about 10 degrees of external rotation internal rotation to L4 is painful. Strength: He does have pain associated weakness 4-5 with both supra and infraspinatus testing but subscap is better at 5 out of 5. Special Tests: Appear to have negative drop and lift off testing to can testing does produce pain in the range of 5-6 out of 10.  7 out of 10 pain with impingement testing. Not since high-grade instability negative labral testing and speeds testing noted.   Negative screening cervical testing       Skin: There are no rashes, ulcerations or lesions. Distal motor sensory and vascular exam is intact. Gait: Fluid smooth gait. Reflexes:  Symmetrically preserved. Additional Comments:        Additional Examinations:  Contralateral Exam: Examination of the right shoulder reveals no atrophy or deformity. The skin is warm and dry. Range of motion is within normal limits. There is no focal tenderness with palpation. No AC joint tenderness. Negative Neer's and De La Vega-Gregory exams. Strength is graded 5/5 throughout. Right Upper Extremity:  Examination of the right upper extremity does not show any tenderness, deformity or injury. Range of motion is unremarkable. There is no gross instability. There are no rashes, ulcerations or lesions. Strength and tone are normal.  Left Upper Extremity: Examination of the left upper extremity does not show any tenderness, deformity or injury. Range of motion is unremarkable. There is no gross instability. There are no rashes, ulcerations or lesions. Strength and tone are normal.  Neck: Examination of the neck does not show any tenderness, deformity or injury. Range of motion is unremarkable. There is no gross instability. There are no rashes, ulcerations or lesions. Strength and tone are normal.         Diagnostic Test Findings:   Left shoulder true AP outlet and axillary films were obtained today and does show evidence of AC arthropathy with slight downsloping to his acromion without evidence of acute osseous injury. Assessment: #1.  10-year status post episodically symptomatic chronic left shoulder pain with suspected cuff tendinopathy and left shoulder impingement  #2. Recent evaluation for chronic right ring finger PIP extension loss with underlying osteoarthritis at the episodic right hand numbness and tingling with pending right upper extremity EMG 8/12/2022       Impression:    Encounter Diagnoses   Name Primary? 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     Carpal tunnel syndrome, right     Trigger ring finger of right hand     Numbness of right hand     Right hand pain        Office Procedures:     Orders Placed This Encounter   Procedures    XR SHOULDER LEFT (MIN 2 VIEWS)     Standing Status:   Future     Number of Occurrences:   1     Standing Expiration Date:   7/26/2023    MRI SHOULDER LEFT WO CONTRAST     Standing Status:   Future     Standing Expiration Date:   7/26/2023     Scheduling Instructions:      [facility] [place] [address]      [phone]            AUTH #:      TIME AND DATE TBD      PLEASE CALL PATIENT ONCE APPROVED TO SCHEDULE       PUSH TO [employer]            Remember that it may take several business days to pre-cert your MRI through your insurance. Our office will contact you as soon as we have the approval. We will not give any test results over the phone. Please call [phone] once you have your test day and time to schedule a follow up with Dr. Ritu Weir. Order Specific Question:   Reason for exam:     Answer:   R/O ROTATOR CUFF TEAR, IMPINGMENT, AC ARTHROPATHY, LABRAL TEAR    [facility] Physical Therapy- [facility]  (Ortho & Sports Performance)-OSR     Referral Priority:   Routine     Referral Type:   Eval and Treat     Referral Reason:   Specialty Services Required     Requested Specialty:   Physical Therapist     Number of Visits Requested:   1       Treatment Plan: Treatment options were discussed with Alexia Agustin today. We did review his recent left shoulder films and exam findings. It sounds as if he has had chronic episodic longstanding left shoulder pain since roughly 2012 without evidence of injury.   This is become more consistent with flares happening more frequently and as such I would like for him to have an MRI to evaluate for substantial rotator cuff tearing and evaluate degree of AC arthropathy impingement and rule out labral tearing. We did place him on a Medrol Dosepak but he will monitor his sugars and has yet to  his Lodine 400 mg 1 pill twice daily. I would like for him to set up physical therapy for his left shoulder and we held off on steroid injections for right now. He can also attend some therapy for his right hand and chronic extension loss at his right ring finger as opposed to going to dedicated hand therapy as he does have transportation issues. It may make most sense for him to rehab at [facility] since today location [place]. We will see him back post imaging he will contact us in the interim with questions or concerns. This dictation was performed with a verbal recognition program (DRAGON) and it was checked for errors. It is possible that there are still dictated errors within this office note. If so, please bring any errors to my attention for an addendum. All efforts were made to ensure that this office note is accurate.

## 2022-07-26 NOTE — TELEPHONE ENCOUNTER
Left voicemail for patient that their MRI has been authorized and that they can call and schedule scan at their convenience. Also told them that they can call and schedule a f/u with Dr. Ransom Fothergill once they have MRI scheduled, leaving at least 2-3 days for our office to receive their results.

## 2022-08-15 RX ORDER — INSULIN GLARGINE 100 [IU]/ML
INJECTION, SOLUTION SUBCUTANEOUS
Qty: 9 ML | Refills: 0 | Status: SHIPPED | OUTPATIENT
Start: 2022-08-15

## 2022-08-15 RX ORDER — PEN NEEDLE, DIABETIC 32GX 5/32"
NEEDLE, DISPOSABLE MISCELLANEOUS
Qty: 100 EACH | Refills: 0 | Status: SHIPPED | OUTPATIENT
Start: 2022-08-15

## 2022-09-06 ENCOUNTER — TELEPHONE (OUTPATIENT)
Dept: FAMILY MEDICINE CLINIC | Age: 47
End: 2022-09-06

## 2022-09-06 DIAGNOSIS — M79.10 MUSCLE PAIN: Primary | ICD-10-CM

## 2022-09-06 NOTE — TELEPHONE ENCOUNTER
Patient paged me over the weekend reporting worsening \"charley horse\" cramps of legs. The pain is keeping him up at night. He reports that these episodes are becoming more painful and more frequent. He denied associated weakness, paresthesias. I asked him to have a trial off of his statin for about 6 weeks. He agreed to have lab work sometime early this week. Okay for him to have a work note if needed for today and tomorrow as well.

## 2022-09-13 RX ORDER — OMEPRAZOLE 40 MG/1
40 CAPSULE, DELAYED RELEASE ORAL DAILY
Qty: 90 CAPSULE | Refills: 1 | Status: SHIPPED | OUTPATIENT
Start: 2022-09-13

## 2022-09-13 RX ORDER — LISINOPRIL 5 MG/1
5 TABLET ORAL DAILY
Qty: 90 TABLET | Refills: 1 | Status: SHIPPED | OUTPATIENT
Start: 2022-09-13

## 2022-09-13 RX ORDER — ATORVASTATIN CALCIUM 20 MG/1
20 TABLET, FILM COATED ORAL DAILY
Qty: 90 TABLET | Refills: 1 | Status: SHIPPED | OUTPATIENT
Start: 2022-09-13 | End: 2022-12-12

## 2022-11-09 ENCOUNTER — APPOINTMENT (OUTPATIENT)
Dept: GENERAL RADIOLOGY | Age: 47
End: 2022-11-09
Payer: MEDICAID

## 2022-11-09 ENCOUNTER — HOSPITAL ENCOUNTER (EMERGENCY)
Age: 47
Discharge: HOME | End: 2022-11-09
Attending: EMERGENCY MEDICINE
Payer: MEDICAID

## 2022-11-09 ENCOUNTER — APPOINTMENT (OUTPATIENT)
Dept: CT IMAGING | Age: 47
End: 2022-11-09
Payer: MEDICAID

## 2022-11-09 VITALS
HEIGHT: 70 IN | TEMPERATURE: 99.8 F | OXYGEN SATURATION: 100 % | SYSTOLIC BLOOD PRESSURE: 136 MMHG | BODY MASS INDEX: 30.35 KG/M2 | DIASTOLIC BLOOD PRESSURE: 98 MMHG | RESPIRATION RATE: 16 BRPM | WEIGHT: 212 LBS | HEART RATE: 86 BPM

## 2022-11-09 DIAGNOSIS — R11.2 NAUSEA AND VOMITING, UNSPECIFIED VOMITING TYPE: ICD-10-CM

## 2022-11-09 DIAGNOSIS — K76.9 LIVER LESION: ICD-10-CM

## 2022-11-09 DIAGNOSIS — R05.1 ACUTE COUGH: ICD-10-CM

## 2022-11-09 DIAGNOSIS — U07.1 COVID-19: Primary | ICD-10-CM

## 2022-11-09 LAB
A/G RATIO: 1.6 (ref 1.1–2.2)
ALBUMIN SERPL-MCNC: 4.4 G/DL (ref 3.4–5)
ALP BLD-CCNC: 95 U/L (ref 40–129)
ALT SERPL-CCNC: 14 U/L (ref 10–40)
ANION GAP SERPL CALCULATED.3IONS-SCNC: 15 MMOL/L (ref 3–16)
AST SERPL-CCNC: 14 U/L (ref 15–37)
BASOPHILS ABSOLUTE: 0 K/UL (ref 0–0.2)
BASOPHILS RELATIVE PERCENT: 0.4 %
BILIRUB SERPL-MCNC: 0.7 MG/DL (ref 0–1)
BUN BLDV-MCNC: 7 MG/DL (ref 7–20)
CALCIUM SERPL-MCNC: 9.3 MG/DL (ref 8.3–10.6)
CHLORIDE BLD-SCNC: 104 MMOL/L (ref 99–110)
CO2: 24 MMOL/L (ref 21–32)
CREAT SERPL-MCNC: 1 MG/DL (ref 0.9–1.3)
EOSINOPHILS ABSOLUTE: 0.1 K/UL (ref 0–0.6)
EOSINOPHILS RELATIVE PERCENT: 0.6 %
GFR SERPL CREATININE-BSD FRML MDRD: >60 ML/MIN/{1.73_M2}
GLUCOSE BLD-MCNC: 118 MG/DL (ref 70–99)
GLUCOSE BLD-MCNC: 124 MG/DL (ref 70–99)
HCT VFR BLD CALC: 42.8 % (ref 40.5–52.5)
HEMOGLOBIN: 14.3 G/DL (ref 13.5–17.5)
LIPASE: 32 U/L (ref 13–60)
LYMPHOCYTES ABSOLUTE: 0.8 K/UL (ref 1–5.1)
LYMPHOCYTES RELATIVE PERCENT: 9 %
MCH RBC QN AUTO: 32.9 PG (ref 26–34)
MCHC RBC AUTO-ENTMCNC: 33.5 G/DL (ref 31–36)
MCV RBC AUTO: 98.1 FL (ref 80–100)
MONOCYTES ABSOLUTE: 0.9 K/UL (ref 0–1.3)
MONOCYTES RELATIVE PERCENT: 9.9 %
NEUTROPHILS ABSOLUTE: 7.1 K/UL (ref 1.7–7.7)
NEUTROPHILS RELATIVE PERCENT: 80.1 %
PDW BLD-RTO: 13.4 % (ref 12.4–15.4)
PERFORMED ON: ABNORMAL
PLATELET # BLD: 176 K/UL (ref 135–450)
PMV BLD AUTO: 9.4 FL (ref 5–10.5)
POTASSIUM SERPL-SCNC: 3.8 MMOL/L (ref 3.5–5.1)
RAPID INFLUENZA  B AGN: NEGATIVE
RAPID INFLUENZA A AGN: NEGATIVE
RBC # BLD: 4.37 M/UL (ref 4.2–5.9)
SARS-COV-2, NAAT: DETECTED
SODIUM BLD-SCNC: 143 MMOL/L (ref 136–145)
TOTAL PROTEIN: 7.2 G/DL (ref 6.4–8.2)
TROPONIN: <0.01 NG/ML
WBC # BLD: 8.9 K/UL (ref 4–11)

## 2022-11-09 PROCEDURE — 6360000002 HC RX W HCPCS: Performed by: EMERGENCY MEDICINE

## 2022-11-09 PROCEDURE — 96374 THER/PROPH/DIAG INJ IV PUSH: CPT

## 2022-11-09 PROCEDURE — 2580000003 HC RX 258: Performed by: EMERGENCY MEDICINE

## 2022-11-09 PROCEDURE — 85025 COMPLETE CBC W/AUTO DIFF WBC: CPT

## 2022-11-09 PROCEDURE — 71045 X-RAY EXAM CHEST 1 VIEW: CPT

## 2022-11-09 PROCEDURE — 74176 CT ABD & PELVIS W/O CONTRAST: CPT

## 2022-11-09 PROCEDURE — 36415 COLL VENOUS BLD VENIPUNCTURE: CPT

## 2022-11-09 PROCEDURE — 83690 ASSAY OF LIPASE: CPT

## 2022-11-09 PROCEDURE — 96361 HYDRATE IV INFUSION ADD-ON: CPT

## 2022-11-09 PROCEDURE — 93005 ELECTROCARDIOGRAM TRACING: CPT | Performed by: EMERGENCY MEDICINE

## 2022-11-09 PROCEDURE — 87804 INFLUENZA ASSAY W/OPTIC: CPT

## 2022-11-09 PROCEDURE — 84484 ASSAY OF TROPONIN QUANT: CPT

## 2022-11-09 PROCEDURE — 99285 EMERGENCY DEPT VISIT HI MDM: CPT

## 2022-11-09 PROCEDURE — 87635 SARS-COV-2 COVID-19 AMP PRB: CPT

## 2022-11-09 PROCEDURE — 80053 COMPREHEN METABOLIC PANEL: CPT

## 2022-11-09 PROCEDURE — 6370000000 HC RX 637 (ALT 250 FOR IP): Performed by: EMERGENCY MEDICINE

## 2022-11-09 RX ORDER — ONDANSETRON 2 MG/ML
4 INJECTION INTRAMUSCULAR; INTRAVENOUS ONCE
Status: COMPLETED | OUTPATIENT
Start: 2022-11-09 | End: 2022-11-09

## 2022-11-09 RX ORDER — ONDANSETRON 4 MG/1
4 TABLET, ORALLY DISINTEGRATING ORAL EVERY 8 HOURS PRN
Qty: 20 TABLET | Refills: 0 | Status: SHIPPED | OUTPATIENT
Start: 2022-11-09

## 2022-11-09 RX ORDER — 0.9 % SODIUM CHLORIDE 0.9 %
1000 INTRAVENOUS SOLUTION INTRAVENOUS ONCE
Status: COMPLETED | OUTPATIENT
Start: 2022-11-09 | End: 2022-11-09

## 2022-11-09 RX ORDER — ACETAMINOPHEN 325 MG/1
650 TABLET ORAL ONCE
Status: COMPLETED | OUTPATIENT
Start: 2022-11-09 | End: 2022-11-09

## 2022-11-09 RX ORDER — ACETAMINOPHEN 500 MG
500 TABLET ORAL 4 TIMES DAILY PRN
Qty: 20 TABLET | Refills: 0 | Status: SHIPPED | OUTPATIENT
Start: 2022-11-09 | End: 2022-11-14

## 2022-11-09 RX ADMIN — ONDANSETRON 4 MG: 2 INJECTION INTRAMUSCULAR; INTRAVENOUS at 16:59

## 2022-11-09 RX ADMIN — ACETAMINOPHEN 650 MG: 325 TABLET ORAL at 17:12

## 2022-11-09 RX ADMIN — SODIUM CHLORIDE 1000 ML: 9 INJECTION, SOLUTION INTRAVENOUS at 16:55

## 2022-11-09 ASSESSMENT — PAIN DESCRIPTION - LOCATION: LOCATION: BACK;THROAT

## 2022-11-09 ASSESSMENT — ENCOUNTER SYMPTOMS
FACIAL SWELLING: 0
WHEEZING: 0
SHORTNESS OF BREATH: 0
COUGH: 1
COLOR CHANGE: 0
STRIDOR: 0
PHOTOPHOBIA: 0
TROUBLE SWALLOWING: 0
BLOOD IN STOOL: 0
BACK PAIN: 1
ABDOMINAL PAIN: 1
NAUSEA: 1
VOMITING: 0
VOICE CHANGE: 0

## 2022-11-09 ASSESSMENT — PAIN DESCRIPTION - FREQUENCY: FREQUENCY: CONTINUOUS

## 2022-11-09 ASSESSMENT — LIFESTYLE VARIABLES
HOW OFTEN DO YOU HAVE A DRINK CONTAINING ALCOHOL: NEVER
HOW MANY STANDARD DRINKS CONTAINING ALCOHOL DO YOU HAVE ON A TYPICAL DAY: PATIENT DOES NOT DRINK

## 2022-11-09 ASSESSMENT — PAIN DESCRIPTION - PAIN TYPE: TYPE: ACUTE PAIN

## 2022-11-09 ASSESSMENT — PAIN DESCRIPTION - DESCRIPTORS
DESCRIPTORS: ACHING;CRAMPING
DESCRIPTORS: ACHING

## 2022-11-09 ASSESSMENT — PAIN SCALES - GENERAL
PAINLEVEL_OUTOF10: 10
PAINLEVEL_OUTOF10: 6
PAINLEVEL_OUTOF10: 8
PAINLEVEL_OUTOF10: 9

## 2022-11-09 NOTE — ED PROVIDER NOTES
[id number] [facility]  eMERGENCY dEPARTMENT eNCOUnter      Pt Name: Stefany Chu  MRN: [id number]  [place] 1975  Date of evaluation: 11/9/2022  Provider: Duane Mujica MD    [address] [place] [address]       Chief Complaint   Patient presents with    Generalized Body Aches     Pt presents with vomiting, generalized weakness, cough, H/A, chest pain with inhalation and abdominal pain. Pt states symptoms started 11/8/22 and getting worse. Per pt, son told him he fainted this morning but he does not remember. Hx of diabetes, HTN and hyperlipemia. Pt has not checked blood glucose today. Pain 8/0-10. Pt has previous hx of fainting and temporarily placed on monitor about 7 years ago. HISTORY OF PRESENT ILLNESS   (Location/Symptom, Timing/Onset, Context/Setting, Quality, Duration, Modifying Factors, Severity)  Note limiting factors. Stefany Chu is a 56 y.o. male who presents with 2 days of generalized weakness, cough, headache, chest pain, abdominal pain, nausea and vomiting. Patient reports that his son informed him that he fainted this morning however the patient does not remember this. Patient denies any chest pain, leg swelling, or hemoptysis. Patient reports symptoms are moderate severe constant and worsening. Denies any known aggravating or alleviating factors. HPI    Nursing Notes were reviewed. REVIEW OFSYSTEMS    (2-9 systems for level 4, 10 or more for level 5)     Review of Systems   Constitutional:  Positive for fatigue. Negative for appetite change, fever and unexpected weight change. HENT:  Negative for facial swelling, trouble swallowing and voice change. Eyes:  Negative for photophobia and visual disturbance. Respiratory:  Positive for cough. Negative for shortness of breath, wheezing and stridor. Cardiovascular:  Negative for chest pain and palpitations. Gastrointestinal:  Positive for abdominal pain and nausea. Negative for blood in stool and vomiting. Genitourinary:  Negative for difficulty urinating and dysuria. Musculoskeletal:  Positive for arthralgias, back pain and myalgias. Negative for gait problem and neck pain. Skin:  Negative for color change and wound. Neurological:  Positive for headaches. Negative for seizures, syncope and speech difficulty. Psychiatric/Behavioral:  Negative for self-injury and suicidal ideas. Except as noted above the remainder of the review of systems was reviewed and negative. 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 7/19/2022    Psychiatric problem     depression         SURGICAL HISTORY       Past Surgical History:   Procedure Laterality Date    HIP SURGERY  01/01/2013    right, mass removed         CURRENT MEDICATIONS       Previous Medications    ALCOHOL SWABS 70 % PADS    Apply 1 Units topically 4 times daily    ASPIRIN 81 MG EC TABLET    Take 1 tablet by mouth daily    ATORVASTATIN (LIPITOR) 20 MG TABLET    TAKE 1 TABLET BY MOUTH DAILY    BACLOFEN (LIORESAL) 10 MG TABLET    TAKE 1 TABLET BY MOUTH TWICE DAILY    BASAGLAR KWIKPEN 100 UNIT/ML INJECTION PEN    INJECT 50 UNITS UNDER THE SKIN TWICE DAILY    BLOOD GLUCOSE MONITORING SUPPL (ONE TOUCH ULTRA 2) W/DEVICE KIT    1 kit by Does not apply route daily    BLOOD GLUCOSE TEST STRIPS (ONETOUCH ULTRA) STRIP    TEST FOUR TIMES DAILY    DULAGLUTIDE (TRULICITY) 1.5 GH/3.0DL SOPN    ADMINISTER 1.5 MG UNDER THE SKIN 1 TIME A WEEK    ETODOLAC (LODINE) 400 MG TABLET    Take 1 tablet by mouth in the morning and 1 tablet before bedtime.     FREESTYLE LANCETS MISC    100 each by Does not apply route 3 times daily    INSULIN LISPRO, 1 UNIT DIAL, 100 UNIT/ML SOPN    Inject 30 Units into the skin 3 times daily (before meals) 30 units into the skin three items daily before meals INSULIN PEN NEEDLE (BD PEN NEEDLE VIBHA 2ND GEN) 32G X 4 MM MISC    USE AS DIRECTED FOUR TIMES DAILY    KETOROLAC (TORADOL) 10 MG TABLET    Take 1 tablet by mouth every 6 hours as needed for Pain    LISINOPRIL (PRINIVIL;ZESTRIL) 5 MG TABLET    TAKE 1 TABLET BY MOUTH DAILY    METHYLPREDNISOLONE (MEDROL DOSEPACK) 4 MG TABLET    Take by mouth as directed. OMEPRAZOLE (PRILOSEC) 40 MG DELAYED RELEASE CAPSULE    TAKE 1 CAPSULE BY MOUTH DAILY    VITAMIN B-12 (CYANOCOBALAMIN) 1000 MCG TABLET    Take 2 tablets by mouth daily       ALLERGIES     Dilaudid [hydromorphone]    FAMILY HISTORY       Family History   Problem Relation Age of Onset    Diabetes Maternal Grandmother           SOCIAL HISTORY       Social History     Socioeconomic History    Marital status: Single     Spouse name: None    Number of children: None    Years of education: None    Highest education level: None   Tobacco Use    Smoking status: Former    Smokeless tobacco: Former     Quit date: 1/8/2007   Vaping Use    Vaping Use: Never used   Substance and Sexual Activity    Alcohol use: Not Currently    Drug use: Not Currently     Types: Marijuana Kenard Snooks)     Comment: \"Smoke a little weed now and then for appetitie\". Sexual activity: Yes     Partners: Female   Social History Narrative    , 3 children, 3 gc, works at [employer], likes video games on [employer], sports         "[employer] 35    (up to 7 for level 4, 8 or more for level 5)     ED Triage Vitals   BP Temp Temp Source Heart Rate Resp SpO2 Height Weight   11/09/22 1530 11/09/22 1530 11/09/22 1530 11/09/22 1530 11/09/22 1530 11/09/22 1530 11/09/22 1537 11/09/22 1537   (!) 155/88 99 °F (37.2 °C) Oral 78 16 100 % 5' 10\" (1.778 m) 212 lb (96.2 kg)       Physical Exam  Vitals and nursing note reviewed. Constitutional:       General: He is not in acute distress. Appearance: He is well-developed. HENT:      Head: Normocephalic and atraumatic.       Right Ear: External ear normal. Left Ear: External ear normal.   Eyes:      Conjunctiva/sclera: Conjunctivae normal.   Neck:      Vascular: No JVD. Trachea: No tracheal deviation. Cardiovascular:      Rate and Rhythm: Normal rate. Pulmonary:      Effort: Pulmonary effort is normal. No respiratory distress. Breath sounds: Normal breath sounds. No wheezing. Abdominal:      General: There is no distension. Palpations: Abdomen is soft. Tenderness: There is no abdominal tenderness. There is no guarding or rebound. Musculoskeletal:         General: No tenderness. Normal range of motion. Cervical back: Neck supple. Skin:     General: Skin is warm and dry. Neurological:      Mental Status: He is alert. Cranial Nerves: No cranial nerve deficit. DIAGNOSTIC RESULTS     RADIOLOGY:     Interpretation per the Radiologist below, if available at the time of this note:    CT ABDOMEN PELVIS WO CONTRAST Additional Contrast? None   Final Result   No acute abnormality. Several liver lesions as described above are   indeterminate and the larger lesion has enlarged from 2012; recommend   nonemergent MRI for further evaluation. XR CHEST PORTABLE   Final Result   Stable chest with no acute abnormality seen.                LABS:  Labs Reviewed   COVID-19, RAPID - Abnormal; Notable for the following components:       Result Value    SARS-CoV-2, NAAT DETECTED (*)     All other components within normal limits   CBC WITH AUTO DIFFERENTIAL - Abnormal; Notable for the following components:    Lymphocytes Absolute 0.8 (*)     All other components within normal limits   COMPREHENSIVE METABOLIC PANEL - Abnormal; Notable for the following components:    Glucose 124 (*)     AST 14 (*)     All other components within normal limits   POCT GLUCOSE - Abnormal; Notable for the following components:    POC Glucose 118 (*)     All other components within normal limits   RAPID INFLUENZA A/B ANTIGENS   LIPASE   TROPONIN       All otherlabs were within normal range or not returned as of this dictation. EMERGENCY DEPARTMENT COURSE and DIFFERENTIAL DIAGNOSIS/MDM:   Vitals:    Vitals:    11/09/22 1530 11/09/22 1537 11/09/22 1759   BP: (!) 155/88  (!) 136/98   Pulse: 78  86   Resp: 16     Temp: 99 °F (37.2 °C)  99.8 °F (37.7 °C)   TempSrc: Oral  Oral   SpO2: 100%     Weight:  212 lb (96.2 kg)    Height:  5' 10\" (1.778 m)          MDM  Patient is afebrile, nontoxic-appearing, hemodynamically stable. Oxygen saturation 100%. Patient has positive for COVID-19 and reports multiple other family numbers are sick. Remainder of laboratory evaluation and imaging do not show any acute emergent pathology however several liver lesions were seen including 1 which waslarger than it had been in 2012. Patient has normal liver function I do not suspect acute liver failure at this time. I fear this to be an incidental finding. I have made the patient aware of this as well as a nonemergent MRI being recommended for further evaluation and the importance of follow-up with his primary care doctor to schedule this nonemergent MRI. Feel patient is appropriate for work note, Tylenol, Zofran, primary care follow-up, and strict ER return precautions for shortness of breath or worsening symptoms. Patient expresses understanding and agreement with this plan and is discharged home. Procedures    FINAL IMPRESSION      1. COVID-19    2. Acute cough    3. Nausea and vomiting, unspecified vomiting type    4.  Liver lesion          DISPOSITION/PLAN   DISPOSITION Decision To Discharge 11/09/2022 07:04:48 PM      PATIENT REFERRED TO:  Richy Thompson DO  [address] [place] [address]  [phone]    In 1 week  For follow up and MRI of liver lesions    [place] [address]  [phone]    If symptoms worsen    MEDICATIONS:  New Prescriptions    ACETAMINOPHEN (TYLENOL) 500 MG TABLET Take 1 tablet by mouth 4 times daily as needed for Pain or Fever    ONDANSETRON (ZOFRAN ODT) 4 MG DISINTEGRATING TABLET    Take 1 tablet by mouth every 8 hours as needed for Nausea          (Please note that portions of this note were completed with a voice recognition program.  Efforts were made to edit the dictations but occasionally words aremis-transcribed. )    Benito Velasco MD (electronically signed)  Attending Emergency Physician           Benito Velasco MD  11/09/22 Peggy Aguilar

## 2022-11-10 LAB
EKG ATRIAL RATE: 84 BPM
EKG DIAGNOSIS: NORMAL
EKG P AXIS: 59 DEGREES
EKG P-R INTERVAL: 158 MS
EKG Q-T INTERVAL: 356 MS
EKG QRS DURATION: 92 MS
EKG QTC CALCULATION (BAZETT): 420 MS
EKG R AXIS: -9 DEGREES
EKG T AXIS: 39 DEGREES
EKG VENTRICULAR RATE: 84 BPM

## 2022-11-11 ENCOUNTER — TELEMEDICINE (OUTPATIENT)
Dept: FAMILY MEDICINE CLINIC | Age: 47
End: 2022-11-11
Payer: MEDICAID

## 2022-11-11 DIAGNOSIS — H92.03 ACUTE EAR PAIN, BILATERAL: ICD-10-CM

## 2022-11-11 DIAGNOSIS — U07.1 COVID-19: Primary | ICD-10-CM

## 2022-11-11 DIAGNOSIS — K76.9 LIVER LESION: ICD-10-CM

## 2022-11-11 PROCEDURE — 99213 OFFICE O/P EST LOW 20 MIN: CPT | Performed by: FAMILY MEDICINE

## 2022-11-11 RX ORDER — AZITHROMYCIN 250 MG/1
250 TABLET, FILM COATED ORAL SEE ADMIN INSTRUCTIONS
Qty: 6 TABLET | Refills: 0 | Status: SHIPPED | OUTPATIENT
Start: 2022-11-11 | End: 2022-11-16

## 2022-11-17 ENCOUNTER — TELEPHONE (OUTPATIENT)
Dept: FAMILY MEDICINE CLINIC | Age: 47
End: 2022-11-17

## 2022-11-17 NOTE — TELEPHONE ENCOUNTER
Would recommend trying generic Flonase, Mucinex over-the-counter with plenty of fluids. If not improving by Monday, or any worsening, please have him let me know.

## 2022-11-17 NOTE — TELEPHONE ENCOUNTER
Pt is calling in to see if message has been addresses. Advised that he would get a response tomorrow.

## 2022-11-17 NOTE — TELEPHONE ENCOUNTER
Pt called stating he had covid and was given medication and his ears were hurting in the beginning and now they feel like they are clogged. Pt stated last night they popped and he could hear better but woke up today and they are full again. He feels like he is all clogged up. Pt is wondering what he can take or do for this. Please advise.  Pt is reachable at [phone]

## 2022-11-22 NOTE — TELEPHONE ENCOUNTER
Medication:   Requested Prescriptions     Pending Prescriptions Disp Refills    BASAGLAR KWIKPEN 100 UNIT/ML injection pen [Pharmacy Med Name: Kaylee Ortiz 100 U/ML KWIKPEN INJ 3ML] 9 mL 0     Sig: ADMINISTER 50 UNITS UNDER THE SKIN TWICE DAILY        Last Filled:  8/15/2022    Patient Phone Number: [phone] (home)     Last appt: 11/11/2022   Next appt: Visit date not found    Last OARRS: No flowsheet data found.

## 2022-11-24 RX ORDER — INSULIN GLARGINE 100 [IU]/ML
INJECTION, SOLUTION SUBCUTANEOUS
Qty: 9 ML | Refills: 0 | Status: SHIPPED | OUTPATIENT
Start: 2022-11-24

## 2022-12-22 RX ORDER — IBUPROFEN 600 MG/1
600 TABLET ORAL 3 TIMES DAILY PRN
Qty: 90 TABLET | Refills: 0 | Status: SHIPPED | OUTPATIENT
Start: 2022-12-22

## 2022-12-22 NOTE — TELEPHONE ENCOUNTER
Medication:   Requested Prescriptions     Pending Prescriptions Disp Refills    ibuprofen (ADVIL;MOTRIN) 600 MG tablet 90 tablet 1     Sig: Take 1 tablet by mouth 3 times daily as needed for Pain       Last Filled:      Patient Phone Number: [phone] (home)     Last appt: 11/11/2022   Next appt: Visit date not found

## 2023-01-16 DIAGNOSIS — Z79.4 TYPE 2 DIABETES MELLITUS WITH DIABETIC NEUROPATHY, WITH LONG-TERM CURRENT USE OF INSULIN (HCC): ICD-10-CM

## 2023-01-16 DIAGNOSIS — E11.40 TYPE 2 DIABETES MELLITUS WITH DIABETIC NEUROPATHY, WITH LONG-TERM CURRENT USE OF INSULIN (HCC): ICD-10-CM

## 2023-01-16 RX ORDER — BLOOD SUGAR DIAGNOSTIC
STRIP MISCELLANEOUS
Qty: 400 STRIP | Refills: 5 | Status: SHIPPED | OUTPATIENT
Start: 2023-01-16

## 2023-01-16 NOTE — TELEPHONE ENCOUNTER
Medication:   Requested Prescriptions     Pending Prescriptions Disp Refills    blood glucose test strips (ONETOUCH ULTRA) strip 400 strip 5     Sig: TEST FOUR TIMES DAILY       Last Filled:      Patient Phone Number: [phone] (home)     Last appt: 11/11/2022   Next appt: Visit date not found

## 2023-04-07 RX ORDER — ASPIRIN 81 MG/1
TABLET, COATED ORAL
Qty: 90 TABLET | Refills: 1 | Status: SHIPPED | OUTPATIENT
Start: 2023-04-07

## 2023-04-07 NOTE — TELEPHONE ENCOUNTER
Medication:   Requested Prescriptions     Pending Prescriptions Disp Refills    ASPIRIN LOW DOSE 81 MG EC tablet [Pharmacy Med Name: ASPIRIN 81MG EC LOW DOSE TABLETS] 90 tablet 1     Sig: TAKE 1 TABLET BY MOUTH DAILY       Last Filled:  3/17/22    Patient Phone Number: [phone] (home)     Last appt: 11/11/2022   Next appt: Visit date not found

## 2023-04-10 RX ORDER — IBUPROFEN 600 MG/1
600 TABLET ORAL 3 TIMES DAILY PRN
Qty: 90 TABLET | Refills: 0 | OUTPATIENT
Start: 2023-04-10

## 2023-05-17 NOTE — TELEPHONE ENCOUNTER
Medication:   Requested Prescriptions     Pending Prescriptions Disp Refills    Alcohol Swabs (B-D SINGLE USE SWABS REGULAR) PADS [Pharmacy Med Name: B-D ALCOHOL SWABS] 100 each 3     Sig: USE FOUR TIMES DAILY AS DIRECTED       Last Filled:      Patient Phone Number: [phone] (home)     Last appt: 11/11/2022   Next appt: Visit date not found

## 2023-05-18 RX ORDER — ISOPROPYL ALCOHOL 0.75 G/1
SWAB TOPICAL
Qty: 100 EACH | Refills: 3 | Status: SHIPPED | OUTPATIENT
Start: 2023-05-18

## 2023-06-20 RX ORDER — LISINOPRIL 5 MG/1
5 TABLET ORAL DAILY
Qty: 90 TABLET | Refills: 1 | Status: SHIPPED | OUTPATIENT
Start: 2023-06-20

## 2023-06-20 RX ORDER — DULAGLUTIDE 1.5 MG/.5ML
INJECTION, SOLUTION SUBCUTANEOUS
Qty: 6 ML | Refills: 0 | Status: SHIPPED | OUTPATIENT
Start: 2023-06-20

## 2023-06-20 RX ORDER — LANOLIN ALCOHOL/MO/W.PET/CERES
2000 CREAM (GRAM) TOPICAL DAILY
Qty: 200 TABLET | Refills: 3 | Status: SHIPPED | OUTPATIENT
Start: 2023-06-20

## 2023-06-20 RX ORDER — ATORVASTATIN CALCIUM 20 MG/1
20 TABLET, FILM COATED ORAL DAILY
Qty: 90 TABLET | Refills: 3 | Status: SHIPPED | OUTPATIENT
Start: 2023-06-20 | End: 2024-06-14

## 2023-06-20 RX ORDER — OMEPRAZOLE 40 MG/1
40 CAPSULE, DELAYED RELEASE ORAL DAILY
Qty: 90 CAPSULE | Refills: 3 | Status: SHIPPED | OUTPATIENT
Start: 2023-06-20

## 2023-06-20 RX ORDER — INSULIN GLARGINE 100 [IU]/ML
INJECTION, SOLUTION SUBCUTANEOUS
Qty: 9 ML | Refills: 0 | Status: SHIPPED | OUTPATIENT
Start: 2023-06-20

## 2023-06-20 RX ORDER — BACLOFEN 10 MG/1
TABLET ORAL
Qty: 180 TABLET | Refills: 1 | Status: SHIPPED | OUTPATIENT
Start: 2023-06-20

## 2023-06-20 NOTE — TELEPHONE ENCOUNTER
Patient states he is taking basaglar 30 units bid.     Medication:   Requested Prescriptions     Pending Prescriptions Disp Refills    atorvastatin (LIPITOR) 20 MG tablet 90 tablet 3     Sig: Take 1 tablet by mouth daily    baclofen (LIORESAL) 10 MG tablet 180 tablet 1     Sig: TAKE 1 TABLET BY MOUTH TWICE DAILY    insulin glargine (BASAGLAR KWIKPEN) 100 UNIT/ML injection pen 9 mL 0     Sig: ADMINISTER 50 UNITS UNDER THE SKIN TWICE DAILY    dulaglutide (TRULICITY) 1.5 BX/3.5CD SC injection 18 mL 3     Sig: ADMINISTER 1.5 MG UNDER THE SKIN 1 TIME A WEEK    lisinopril (PRINIVIL;ZESTRIL) 5 MG tablet 90 tablet 1     Sig: Take 1 tablet by mouth daily    omeprazole (PRILOSEC) 40 MG delayed release capsule 90 capsule 1     Sig: Take 1 capsule by mouth daily    vitamin B-12 (CYANOCOBALAMIN) 1000 MCG tablet 200 tablet 3     Sig: Take 2 tablets by mouth daily       Last Filled:      Patient Phone Number: [phone] (home)     Last appt: 11/11/2022   Next appt: Visit date not found

## 2023-07-26 ENCOUNTER — TELEPHONE (OUTPATIENT)
Dept: FAMILY MEDICINE CLINIC | Age: 48
End: 2023-07-26

## 2023-10-02 RX ORDER — PEN NEEDLE, DIABETIC 32GX 5/32"
NEEDLE, DISPOSABLE MISCELLANEOUS
Qty: 100 EACH | Refills: 0 | Status: SHIPPED | OUTPATIENT
Start: 2023-10-02

## 2023-10-02 NOTE — TELEPHONE ENCOUNTER
Medication:   Requested Prescriptions     Pending Prescriptions Disp Refills    BD PEN NEEDLE VIBHA 2ND GEN 32G X 4 MM MISC [Pharmacy Med Name: B-D VIBHA 2ND GEN PEN NDL [id number]] 100 each 0     Sig: USE AS DIRECTED FOUR TIMES DAILY        Last Filled:  08/15/2022    Patient Phone Number: [phone] (home)     Last appt: 11/11/2022   Next appt: Visit date not found    Last OARRS:        No data to display

## 2024-03-11 RX ORDER — PEN NEEDLE, DIABETIC 32GX 5/32"
NEEDLE, DISPOSABLE MISCELLANEOUS
Qty: 100 EACH | Refills: 0 | Status: SHIPPED | OUTPATIENT
Start: 2024-03-11

## 2024-03-11 NOTE — PROGRESS NOTES
Medication:   Requested Prescriptions     Pending Prescriptions Disp Refills    Insulin Pen Needle (BD PEN NEEDLE VIBHA 2ND GEN) 32G X 4 MM MISC 100 each 0     Sig: Use to test four times daily        Last Filled:  10/02/23    Patient Phone Number: [phone] (home)     Last appt: 11/11/2022   Next appt: Visit date not found    Last OARRS:        No data to display

## 2024-07-29 RX ORDER — PEN NEEDLE, DIABETIC 32GX 5/32"
NEEDLE, DISPOSABLE MISCELLANEOUS
Qty: 100 EACH | Refills: 0 | Status: SHIPPED | OUTPATIENT
Start: 2024-07-29

## 2024-07-29 NOTE — TELEPHONE ENCOUNTER
Medication:   Requested Prescriptions     Pending Prescriptions Disp Refills    Insulin Pen Needle (SURE COMFORT PEN NEEDLES) 32G X 4 MM MISC [Pharmacy Med Name: SURE COMFORT PEN NEEDLES 32GX5/32\"] 100 each 0     Sig: USE TO TEST FOUR TIMES DAILY       Last Filled:  3/11/2024    Patient Phone Number: [phone] (home)     Last appt: 11/11/2022   Next appt: Visit date not found

## 2025-06-17 ENCOUNTER — HOSPITAL ENCOUNTER (EMERGENCY)
Age: 50
Discharge: HOME | End: 2025-06-17
Attending: STUDENT IN AN ORGANIZED HEALTH CARE EDUCATION/TRAINING PROGRAM

## 2025-06-17 VITALS
BODY MASS INDEX: 30.59 KG/M2 | RESPIRATION RATE: 18 BRPM | DIASTOLIC BLOOD PRESSURE: 70 MMHG | WEIGHT: 218.5 LBS | HEART RATE: 56 BPM | TEMPERATURE: 98 F | OXYGEN SATURATION: 98 % | SYSTOLIC BLOOD PRESSURE: 156 MMHG | HEIGHT: 71 IN

## 2025-06-17 DIAGNOSIS — G89.29 ACUTE EXACERBATION OF CHRONIC LOW BACK PAIN: Primary | ICD-10-CM

## 2025-06-17 DIAGNOSIS — S39.012A STRAIN OF LUMBAR REGION, INITIAL ENCOUNTER: ICD-10-CM

## 2025-06-17 DIAGNOSIS — M54.50 ACUTE EXACERBATION OF CHRONIC LOW BACK PAIN: Primary | ICD-10-CM

## 2025-06-17 PROCEDURE — 96372 THER/PROPH/DIAG INJ SC/IM: CPT

## 2025-06-17 PROCEDURE — 6360000002 HC RX W HCPCS: Performed by: STUDENT IN AN ORGANIZED HEALTH CARE EDUCATION/TRAINING PROGRAM

## 2025-06-17 PROCEDURE — 99284 EMERGENCY DEPT VISIT MOD MDM: CPT

## 2025-06-17 PROCEDURE — 6370000000 HC RX 637 (ALT 250 FOR IP): Performed by: STUDENT IN AN ORGANIZED HEALTH CARE EDUCATION/TRAINING PROGRAM

## 2025-06-17 RX ORDER — LIDOCAINE 4 G/G
1 PATCH TOPICAL ONCE
Status: DISCONTINUED | OUTPATIENT
Start: 2025-06-17 | End: 2025-06-17 | Stop reason: HOSPADM

## 2025-06-17 RX ORDER — LIDOCAINE 4 G/G
1 PATCH TOPICAL DAILY
Qty: 30 PATCH | Refills: 0 | Status: SHIPPED | OUTPATIENT
Start: 2025-06-17 | End: 2025-07-17

## 2025-06-17 RX ORDER — ACETAMINOPHEN 500 MG
1000 TABLET ORAL ONCE
Status: COMPLETED | OUTPATIENT
Start: 2025-06-17 | End: 2025-06-17

## 2025-06-17 RX ORDER — DIAZEPAM 5 MG/1
5 TABLET ORAL EVERY 6 HOURS PRN
Qty: 20 TABLET | Refills: 0 | Status: SHIPPED | OUTPATIENT
Start: 2025-06-17 | End: 2025-06-27

## 2025-06-17 RX ORDER — KETOROLAC TROMETHAMINE 30 MG/ML
30 INJECTION, SOLUTION INTRAMUSCULAR; INTRAVENOUS ONCE
Status: COMPLETED | OUTPATIENT
Start: 2025-06-17 | End: 2025-06-17

## 2025-06-17 RX ORDER — IBUPROFEN 600 MG/1
600 TABLET, FILM COATED ORAL EVERY 6 HOURS PRN
Qty: 120 TABLET | Refills: 0 | Status: SHIPPED | OUTPATIENT
Start: 2025-06-17

## 2025-06-17 RX ORDER — DIAZEPAM 5 MG/1
5 TABLET ORAL ONCE
Status: DISCONTINUED | OUTPATIENT
Start: 2025-06-17 | End: 2025-06-17

## 2025-06-17 RX ORDER — DIAZEPAM 5 MG/1
10 TABLET ORAL ONCE
Status: COMPLETED | OUTPATIENT
Start: 2025-06-17 | End: 2025-06-17

## 2025-06-17 RX ORDER — OXYCODONE HYDROCHLORIDE 5 MG/1
5 TABLET ORAL ONCE
Refills: 0 | Status: COMPLETED | OUTPATIENT
Start: 2025-06-17 | End: 2025-06-17

## 2025-06-17 RX ORDER — DEXAMETHASONE 4 MG/1
10 TABLET ORAL ONCE
Status: COMPLETED | OUTPATIENT
Start: 2025-06-17 | End: 2025-06-17

## 2025-06-17 RX ORDER — METHYLPREDNISOLONE 4 MG/1
TABLET ORAL
Qty: 1 KIT | Refills: 0 | Status: SHIPPED | OUTPATIENT
Start: 2025-06-17 | End: 2025-06-23

## 2025-06-17 RX ADMIN — KETOROLAC TROMETHAMINE 30 MG: 30 INJECTION, SOLUTION INTRAMUSCULAR at 01:12

## 2025-06-17 RX ADMIN — DIAZEPAM 10 MG: 5 TABLET ORAL at 01:12

## 2025-06-17 RX ADMIN — DEXAMETHASONE 10 MG: 4 TABLET ORAL at 01:12

## 2025-06-17 RX ADMIN — OXYCODONE 5 MG: 5 TABLET ORAL at 01:12

## 2025-06-17 RX ADMIN — ACETAMINOPHEN 1000 MG: 500 TABLET ORAL at 01:12

## 2025-06-17 ASSESSMENT — PAIN DESCRIPTION - ORIENTATION: ORIENTATION: LOWER

## 2025-06-17 ASSESSMENT — PAIN DESCRIPTION - LOCATION
LOCATION: BACK
LOCATION: BACK

## 2025-06-17 ASSESSMENT — PAIN - FUNCTIONAL ASSESSMENT: PAIN_FUNCTIONAL_ASSESSMENT: 0-10

## 2025-06-17 ASSESSMENT — PAIN DESCRIPTION - DESCRIPTORS: DESCRIPTORS: ACHING

## 2025-06-17 ASSESSMENT — PAIN SCALES - GENERAL
PAINLEVEL_OUTOF10: 8
PAINLEVEL_OUTOF10: 9

## 2025-06-17 NOTE — ED NOTES
Pt discharged back home, reviewed discharged instructions with pt and his partner who was at bedside. Follow up care , pain control and return precautions discussed , pt  and partner verbalized understanding. Pt  wheeled out of the department  and assisted into car

## 2025-06-17 NOTE — ED PROVIDER NOTES
acetaminophen (TYLENOL) tablet 1,000 mg (1,000 mg Oral Given 6/17/25 0112)   dexAMETHasone (DECADRON) tablet 10 mg (10 mg Oral Given 6/17/25 0112)   diazePAM (VALIUM) tablet 10 mg (10 mg Oral Given 6/17/25 0112)         Responses to treatment:       PROCEDURES: (None if blank)  Procedures:       CRITICAL CARE: (None if blank)        DISCHARGE PRESCRIPTIONS: (None if blank)  New Prescriptions    DIAZEPAM (VALIUM) 5 MG TABLET    Take 1 tablet by mouth every 6 hours as needed for Anxiety for up to 10 days. Max Daily Amount: 20 mg    DICLOFENAC SODIUM (VOLTAREN) 1 % GEL    Apply 4 g topically 4 times daily    IBUPROFEN (IBU) 600 MG TABLET    Take 1 tablet by mouth every 6 hours as needed for Pain    LIDOCAINE 4 % EXTERNAL PATCH    Place 1 patch onto the skin daily    METHYLPREDNISOLONE (MEDROL, FERCHO,) 4 MG TABLET    Take by mouth.         I am the primary clinician of record.     FINAL IMPRESSION      1. Acute exacerbation of chronic low back pain    2. Strain of lumbar region, initial encounter            DISPOSITION/PLAN   DISPOSITION Discharge - Pending Orders Complete 06/17/2025 01:09:02 AM   DISPOSITION CONDITION Stable         Condition on disposition: Stable    OUTPATIENT FOLLOW UP THE PATIENT:  [facility]  [address]  [place] [id number] [phone]    Call to set up an appointment in the next few days, As needed        Electronically Signed: Arnel Damian MD, 06/17/25, 1:21 AM    This report has been produced using speech recognition software and may contain errors related to that system including errors in grammar, punctuation, and spelling, as well as words and phrases that may be inappropriate. If there are any questions or concerns please feel free to contact the dictating provider for clarification.       Arnel Damian MD  06/17/25 0121